# Patient Record
Sex: FEMALE | Race: WHITE | NOT HISPANIC OR LATINO
[De-identification: names, ages, dates, MRNs, and addresses within clinical notes are randomized per-mention and may not be internally consistent; named-entity substitution may affect disease eponyms.]

---

## 2020-02-06 ENCOUNTER — LABORATORY RESULT (OUTPATIENT)
Age: 60
End: 2020-02-06

## 2020-02-06 ENCOUNTER — NON-APPOINTMENT (OUTPATIENT)
Age: 60
End: 2020-02-06

## 2020-02-06 ENCOUNTER — APPOINTMENT (OUTPATIENT)
Dept: INTERNAL MEDICINE | Facility: CLINIC | Age: 60
End: 2020-02-06
Payer: MEDICAID

## 2020-02-06 VITALS
RESPIRATION RATE: 16 BRPM | HEIGHT: 70 IN | DIASTOLIC BLOOD PRESSURE: 84 MMHG | TEMPERATURE: 98.2 F | OXYGEN SATURATION: 96 % | BODY MASS INDEX: 33.7 KG/M2 | SYSTOLIC BLOOD PRESSURE: 112 MMHG | HEART RATE: 87 BPM | WEIGHT: 235.38 LBS

## 2020-02-06 DIAGNOSIS — Z82.49 FAMILY HISTORY OF ISCHEMIC HEART DISEASE AND OTHER DISEASES OF THE CIRCULATORY SYSTEM: ICD-10-CM

## 2020-02-06 DIAGNOSIS — Z78.9 OTHER SPECIFIED HEALTH STATUS: ICD-10-CM

## 2020-02-06 DIAGNOSIS — Z83.438 FAMILY HISTORY OF OTHER DISORDER OF LIPOPROTEIN METABOLISM AND OTHER LIPIDEMIA: ICD-10-CM

## 2020-02-06 DIAGNOSIS — Z83.52 FAMILY HISTORY OF EAR DISORDERS: ICD-10-CM

## 2020-02-06 PROCEDURE — 93000 ELECTROCARDIOGRAM COMPLETE: CPT | Mod: 59

## 2020-02-06 PROCEDURE — G0442 ANNUAL ALCOHOL SCREEN 15 MIN: CPT

## 2020-02-06 PROCEDURE — 99386 PREV VISIT NEW AGE 40-64: CPT | Mod: 25

## 2020-02-06 PROCEDURE — 36415 COLL VENOUS BLD VENIPUNCTURE: CPT

## 2020-02-06 RX ORDER — UBIDECARENONE/VIT E ACET 100MG-5
25 MCG CAPSULE ORAL DAILY
Qty: 180 | Refills: 0 | Status: ACTIVE | COMMUNITY
Start: 2020-02-06

## 2020-02-06 NOTE — PLAN
[FreeTextEntry1] : Endocrinology\par hyperlipidemia - continue Atorvastatin Calcium 20mg p.o.q.d. - continue low cholesterol/low fat diet - check FLP and LFTs\par hyperglycemia - advised low carbohydrate/low sugar diet - check hemoglobin A1C\par obesity - advised low carbohydrate diet and recommended continue CV exercise\par vitamin D deficiency - continue vitamin D 1000 unit capsules BID p.o.q.d. with meals - check vitamin D \par Gynecology\par osteopenia - results of upcoming repeat bone density testing to be requested  \par Will request results of last mammogram/breast US from [facility]\par Gastroenterology\par screening colonoscopy - referred to gastroenterologist, Dr. Perez as patient is due; results of Cologuard test to be requested \par Infectious Disease\par flu vaccine - discussed, refused\par Tdap (Adacel) Vaccine - evidence of vaccine administration to be requested from patient's former PCP \par Shingrix - discussed, patient to determine if vaccine is covered under medical benefits of current insurance plan and follow up for 1st dose; otherwise, instructed to follow up at the pharmacy for vaccine administration\par \par check EKG (results as above), female panel, and UA

## 2020-02-06 NOTE — PHYSICAL EXAM
[No Acute Distress] : no acute distress [Well Nourished] : well nourished [Well Developed] : well developed [Well-Appearing] : well-appearing [Normal Sclera/Conjunctiva] : normal sclera/conjunctiva [PERRL] : pupils equal round and reactive to light [EOMI] : extraocular movements intact [Normal Outer Ear/Nose] : the outer ears and nose were normal in appearance [Normal Oropharynx] : the oropharynx was normal [No JVD] : no jugular venous distention [No Lymphadenopathy] : no lymphadenopathy [Supple] : supple [Thyroid Normal, No Nodules] : the thyroid was normal and there were no nodules present [No Respiratory Distress] : no respiratory distress  [No Accessory Muscle Use] : no accessory muscle use [Clear to Auscultation] : lungs were clear to auscultation bilaterally [Normal Rate] : normal rate  [Regular Rhythm] : with a regular rhythm [Normal S1, S2] : normal S1 and S2 [No Murmur] : no murmur heard [No Carotid Bruits] : no carotid bruits [No Abdominal Bruit] : a ~M bruit was not heard ~T in the abdomen [No Varicosities] : no varicosities [Pedal Pulses Present] : the pedal pulses are present [No Edema] : there was no peripheral edema [No Extremity Clubbing/Cyanosis] : no extremity clubbing/cyanosis [No Palpable Aorta] : no palpable aorta [Soft] : abdomen soft [Non Tender] : non-tender [Non-distended] : non-distended [No Masses] : no abdominal mass palpated [No HSM] : no HSM [Normal Bowel Sounds] : normal bowel sounds [Normal Posterior Cervical Nodes] : no posterior cervical lymphadenopathy [Normal Anterior Cervical Nodes] : no anterior cervical lymphadenopathy [No CVA Tenderness] : no CVA  tenderness [No Spinal Tenderness] : no spinal tenderness [No Joint Swelling] : no joint swelling [Grossly Normal Strength/Tone] : grossly normal strength/tone [No Rash] : no rash [Coordination Grossly Intact] : coordination grossly intact [No Focal Deficits] : no focal deficits [Normal Gait] : normal gait [Deep Tendon Reflexes (DTR)] : deep tendon reflexes were 2+ and symmetric [Normal Affect] : the affect was normal [Normal Insight/Judgement] : insight and judgment were intact [de-identified] : obese

## 2020-02-06 NOTE — COUNSELING
[Benefits of weight loss discussed] : Benefits of weight loss discussed [Structured Weight Management Program suggested:] : Structured weight management program suggested [Good understanding] : Patient has a good understanding of disease, goals and obesity follow-up plan [Encouraged to increase physical activity] : Encouraged to increase physical activity [FreeTextEntry1] : Discussed low-carbohydrate diet plans.

## 2020-02-06 NOTE — ASSESSMENT
[FreeTextEntry1] : New patient is a 59 year old female with a past medical history as above who presents for annual physical exam.

## 2020-02-06 NOTE — HISTORY OF PRESENT ILLNESS
[FreeTextEntry1] : new patient annual physical exam\par  [de-identified] : New patient is a 59 year old female with a past medical history as below who presents for annual physical exam. Patient states she is taking all medications as prescribed and denies any adverse reactions or side effects. She denies any new arthralgias or myalgias on Atorvastatin Calcium. Last blood work-up revealed elevated hemoglobin A1C (5.8). Cologuard testing was done 6 months ago, but patient has never had a screening colonoscopy. Patient states she saw her OB/GYN last month. Last mammogram/breast US was in November 2019 at [facility]. She notes upcoming repeat bone density testing per her OB/GYN. Patient notes seeing a retina specialist in the past regarding floaters. Patient states she has been trying to maintain a more well-balanced diet and exercise more regularly. Patient is not interested in receiving the flu vaccine today. She states she received the Tetanus vaccine about 3 years ago. She inquires about the Shingles vaccine (Shingrix).

## 2020-02-06 NOTE — ADDENDUM
[FreeTextEntry1] : I, Raffaele Crump, acted solely as scribe for Dr. David Sauer DO on this date 02/06/2020  9:00AM .\par \par All medical record entries made by the Scribe were at my, Dr. David Sauer DO direction and personally dictated by me on 02/06/2020  9:00AM. I have reviewed the chart and agree that the record accurately reflects my personal performance of the history, physical exam, assessment and plan. I have also personally directed, reviewed and agreed with the chart.\par

## 2020-02-06 NOTE — HEALTH RISK ASSESSMENT
[Yes] : Yes [No] : In the past 12 months have you used drugs other than those required for medical reasons? No [1] : 2) Feeling down, depressed, or hopeless for several days (1) [Patient reported mammogram was normal] : Patient reported mammogram was normal [Monthly or less (1 pt)] : Monthly or less (1 point) [1 or 2 (0 pts)] : 1 or 2 (0 points) [Never (0 pts)] : Never (0 points) [] : No [Audit-CScore] : 1 [[id number]] : 2 [MammogramDate] : 11/19 [MammogramComments] : Will request results of mammogram/breast US from [facility].  [BoneDensityComments] : Scheduled; results to be requested. [ColonoscopyComments] : Referred to Dr. Perez.

## 2020-02-09 LAB
25(OH)D3 SERPL-MCNC: 33.3 NG/ML
ALBUMIN SERPL ELPH-MCNC: 4.9 G/DL
ALP BLD-CCNC: 89 U/L
ALT SERPL-CCNC: 21 U/L
ANION GAP SERPL CALC-SCNC: 14 MMOL/L
APPEARANCE: ABNORMAL
AST SERPL-CCNC: 18 U/L
BASOPHILS # BLD AUTO: 0.04 K/UL
BASOPHILS NFR BLD AUTO: 0.5 %
BILIRUB SERPL-MCNC: 0.9 MG/DL
BILIRUBIN URINE: NEGATIVE
BLOOD URINE: NEGATIVE
BUN SERPL-MCNC: 23 MG/DL
CALCIUM SERPL-MCNC: 10.1 MG/DL
CHLORIDE SERPL-SCNC: 101 MMOL/L
CHOLEST SERPL-MCNC: 194 MG/DL
CHOLEST/HDLC SERPL: 4.7 RATIO
CO2 SERPL-SCNC: 28 MMOL/L
COLOR: ABNORMAL
CREAT SERPL-MCNC: 1.05 MG/DL
EOSINOPHIL # BLD AUTO: 0.16 K/UL
EOSINOPHIL NFR BLD AUTO: 2.2 %
ESTIMATED AVERAGE GLUCOSE: 120 MG/DL
GLUCOSE QUALITATIVE U: NEGATIVE
GLUCOSE SERPL-MCNC: 100 MG/DL
HBA1C MFR BLD HPLC: 5.8 %
HCT VFR BLD CALC: 46.5 %
HDLC SERPL-MCNC: 41 MG/DL
HGB BLD-MCNC: 14.7 G/DL
IMM GRANULOCYTES NFR BLD AUTO: 0.4 %
KETONES URINE: NEGATIVE
LDLC SERPL CALC-MCNC: 120 MG/DL
LEUKOCYTE ESTERASE URINE: ABNORMAL
LYMPHOCYTES # BLD AUTO: 2.43 K/UL
LYMPHOCYTES NFR BLD AUTO: 33 %
MAN DIFF?: NORMAL
MCHC RBC-ENTMCNC: 30.8 PG
MCHC RBC-ENTMCNC: 31.6 GM/DL
MCV RBC AUTO: 97.5 FL
MONOCYTES # BLD AUTO: 0.57 K/UL
MONOCYTES NFR BLD AUTO: 7.7 %
NEUTROPHILS # BLD AUTO: 4.14 K/UL
NEUTROPHILS NFR BLD AUTO: 56.2 %
NITRITE URINE: NEGATIVE
PH URINE: 5.5
PLATELET # BLD AUTO: 291 K/UL
POTASSIUM SERPL-SCNC: 4.4 MMOL/L
PROT SERPL-MCNC: 7.2 G/DL
PROTEIN URINE: NORMAL
RBC # BLD: 4.77 M/UL
RBC # FLD: 13.2 %
SODIUM SERPL-SCNC: 143 MMOL/L
SPECIFIC GRAVITY URINE: 1.02
TRIGL SERPL-MCNC: 164 MG/DL
TSH SERPL-ACNC: 4.06 UIU/ML
UROBILINOGEN URINE: NORMAL
WBC # FLD AUTO: 7.37 K/UL

## 2020-07-22 ENCOUNTER — RX RENEWAL (OUTPATIENT)
Age: 60
End: 2020-07-22

## 2020-09-03 ENCOUNTER — LABORATORY RESULT (OUTPATIENT)
Age: 60
End: 2020-09-03

## 2020-09-03 ENCOUNTER — APPOINTMENT (OUTPATIENT)
Dept: INTERNAL MEDICINE | Facility: CLINIC | Age: 60
End: 2020-09-03
Payer: MEDICAID

## 2020-09-03 VITALS
WEIGHT: 236 LBS | HEIGHT: 70 IN | DIASTOLIC BLOOD PRESSURE: 80 MMHG | OXYGEN SATURATION: 98 % | TEMPERATURE: 98.4 F | RESPIRATION RATE: 16 BRPM | SYSTOLIC BLOOD PRESSURE: 124 MMHG | BODY MASS INDEX: 33.79 KG/M2 | HEART RATE: 84 BPM

## 2020-09-03 DIAGNOSIS — Z12.39 ENCOUNTER FOR OTHER SCREENING FOR MALIGNANT NEOPLASM OF BREAST: ICD-10-CM

## 2020-09-03 DIAGNOSIS — Z13.820 ENCOUNTER FOR SCREENING FOR OSTEOPOROSIS: ICD-10-CM

## 2020-09-03 DIAGNOSIS — M85.80 OTHER SPECIFIED DISORDERS OF BONE DENSITY AND STRUCTURE, UNSPECIFIED SITE: ICD-10-CM

## 2020-09-03 PROCEDURE — 99214 OFFICE O/P EST MOD 30 MIN: CPT | Mod: 25

## 2020-09-03 PROCEDURE — 36415 COLL VENOUS BLD VENIPUNCTURE: CPT

## 2020-09-03 NOTE — ASSESSMENT
[FreeTextEntry1] : Patient is a 59 year old female with a past medical history as above who presents for fasting blood work and general follow-up.\par

## 2020-09-03 NOTE — PHYSICAL EXAM
[No Acute Distress] : no acute distress [Well Nourished] : well nourished [Well Developed] : well developed [Well-Appearing] : well-appearing [PERRL] : pupils equal round and reactive to light [Normal Sclera/Conjunctiva] : normal sclera/conjunctiva [EOMI] : extraocular movements intact [Normal Outer Ear/Nose] : the outer ears and nose were normal in appearance [No JVD] : no jugular venous distention [Normal Oropharynx] : the oropharynx was normal [Supple] : supple [No Lymphadenopathy] : no lymphadenopathy [No Respiratory Distress] : no respiratory distress  [Thyroid Normal, No Nodules] : the thyroid was normal and there were no nodules present [No Accessory Muscle Use] : no accessory muscle use [Clear to Auscultation] : lungs were clear to auscultation bilaterally [Normal Rate] : normal rate  [Normal S1, S2] : normal S1 and S2 [Regular Rhythm] : with a regular rhythm [No Murmur] : no murmur heard [No Abdominal Bruit] : a ~M bruit was not heard ~T in the abdomen [No Carotid Bruits] : no carotid bruits [No Varicosities] : no varicosities [No Edema] : there was no peripheral edema [Pedal Pulses Present] : the pedal pulses are present [No Palpable Aorta] : no palpable aorta [No Extremity Clubbing/Cyanosis] : no extremity clubbing/cyanosis [Soft] : abdomen soft [Non Tender] : non-tender [Non-distended] : non-distended [No Masses] : no abdominal mass palpated [Normal Posterior Cervical Nodes] : no posterior cervical lymphadenopathy [Normal Bowel Sounds] : normal bowel sounds [No HSM] : no HSM [Normal Anterior Cervical Nodes] : no anterior cervical lymphadenopathy [No Spinal Tenderness] : no spinal tenderness [No CVA Tenderness] : no CVA  tenderness [No Joint Swelling] : no joint swelling [No Rash] : no rash [Grossly Normal Strength/Tone] : grossly normal strength/tone [No Focal Deficits] : no focal deficits [Coordination Grossly Intact] : coordination grossly intact [Normal Affect] : the affect was normal [Normal Gait] : normal gait [Normal Insight/Judgement] : insight and judgment were intact [de-identified] : obese

## 2020-09-03 NOTE — PLAN
[FreeTextEntry1] : Cardiology\par hyperlipidemia - continue Atorvastatin Calcium 20mg p.o.q.d; discussed increasing dosage pending blood work results - advised low cholesterol/low fat diet - check FLP and LFTs\par hypertriglyceridemia - advised low cholesterol/low fat and low carbohydrate/low sugar diet - check FLP and LFTs\par Endocrinology\par hyperglycemia - advised low carbohydrate/low sugar diet - check hemoglobin A1C\par obesity - advised low carbohydrate diet and CV exercise\par vitamin D deficiency - continue vitamin D 1000 IU BID p.o.q.d. with meals - check vitamin D \par Gynecology\par Rx given for mammogram/breast US\par Musculoskeletal\par Rx given for DEXA scan\par Integumentary\par hair loss - start OTC Biotin p.o.q.d. - check thyroid panel; advised to follow up with dermatologist, Dr. Pickard for further evaluation if blood work is negative \par Immunization\par flu vaccine - discussed, recommended following up in October for vaccine administration \par Shingrix - discussed, patient to determine if vaccine is covered under medical benefits of current insurance plan and follow up for 1st dose; otherwise, instructed to follow up at the pharmacy for vaccine administration\par \par check female panel and thyroid panel

## 2020-09-03 NOTE — ADDENDUM
[FreeTextEntry1] : I, Raffaeel Crump, acted solely as scribe for Dr. David Sauer DO on this date 09/03/2020  8:40AM .\par \par All medical record entries made by the Scribe were at my, Dr. David Sauer DO direction and personally dictated by me on 09/03/2020  8:40AM. I have reviewed the chart and agree that the record accurately reflects my personal performance of the history, physical exam, assessment and plan. I have also personally directed, reviewed and agreed with the chart.\par

## 2020-09-03 NOTE — HISTORY OF PRESENT ILLNESS
[FreeTextEntry1] : fasting blood work and general follow-up [de-identified] : Patient is a 59 year old female with a past medical history as below who presents for fasting blood work and general follow-up. Patient is taking Atorvastatin Calcium as prescribed and denies any diffuse arthralgias or myalgias on the medication. Patient's last mammogram and breast US were in November 2019. She is due for bone density testing. Patient notes hair loss that started about 5 months ago. She no longer has her menstrual cycle. She sees dermatologist, Dr. Pickard. Patient is due to receive the Shingles Vaccine. She is considering the flu vaccine this year.

## 2020-09-03 NOTE — HEALTH RISK ASSESSMENT
[Yes] : Yes [Monthly or less (1 pt)] : Monthly or less (1 point) [1 or 2 (0 pts)] : 1 or 2 (0 points) [Never (0 pts)] : Never (0 points) [No] : In the past 12 months have you used drugs other than those required for medical reasons? No [1] : 2) Feeling down, depressed, or hopeless for several days (1) [Patient reported mammogram was normal] : Patient reported mammogram was normal [HIV Test offered] : HIV Test offered [Hepatitis C test offered] : Hepatitis C test offered [] : No [Audit-CScore] : 1 [[id number]] : 2 [MammogramDate] : 11/19 [MammogramComments] : Will request results of mammogram/breast US from [facility]; Rx given for repeat mammogram/breast US. [BoneDensityComments] : Rx given for DEXA scan.  [ColonoscopyComments] : Previously referred to Dr. Perez.

## 2020-09-15 LAB
25(OH)D3 SERPL-MCNC: 31.4 NG/ML
ALBUMIN SERPL ELPH-MCNC: 4.7 G/DL
ALP BLD-CCNC: 83 U/L
ALT SERPL-CCNC: 41 U/L
ANION GAP SERPL CALC-SCNC: 13 MMOL/L
AST SERPL-CCNC: 35 U/L
BASOPHILS # BLD AUTO: 0.04 K/UL
BASOPHILS NFR BLD AUTO: 0.6 %
BILIRUB SERPL-MCNC: 0.9 MG/DL
BUN SERPL-MCNC: 16 MG/DL
CALCIUM SERPL-MCNC: 9.5 MG/DL
CHLORIDE SERPL-SCNC: 106 MMOL/L
CHOLEST SERPL-MCNC: 173 MG/DL
CHOLEST/HDLC SERPL: 3.9 RATIO
CO2 SERPL-SCNC: 22 MMOL/L
CREAT SERPL-MCNC: 0.9 MG/DL
EOSINOPHIL # BLD AUTO: 0.15 K/UL
EOSINOPHIL NFR BLD AUTO: 2.4 %
ESTIMATED AVERAGE GLUCOSE: 123 MG/DL
GLUCOSE SERPL-MCNC: 110 MG/DL
HBA1C MFR BLD HPLC: 5.9 %
HCT VFR BLD CALC: 45.5 %
HDLC SERPL-MCNC: 44 MG/DL
HGB BLD-MCNC: 14.1 G/DL
IMM GRANULOCYTES NFR BLD AUTO: 0.5 %
LDLC SERPL CALC-MCNC: 108 MG/DL
LYMPHOCYTES # BLD AUTO: 2.17 K/UL
LYMPHOCYTES NFR BLD AUTO: 34.1 %
MAN DIFF?: NORMAL
MCHC RBC-ENTMCNC: 30.7 PG
MCHC RBC-ENTMCNC: 31 GM/DL
MCV RBC AUTO: 98.9 FL
MONOCYTES # BLD AUTO: 0.36 K/UL
MONOCYTES NFR BLD AUTO: 5.7 %
NEUTROPHILS # BLD AUTO: 3.61 K/UL
NEUTROPHILS NFR BLD AUTO: 56.7 %
PLATELET # BLD AUTO: 256 K/UL
POTASSIUM SERPL-SCNC: 4.1 MMOL/L
PROT SERPL-MCNC: 6.9 G/DL
RBC # BLD: 4.6 M/UL
RBC # FLD: 13.1 %
SODIUM SERPL-SCNC: 142 MMOL/L
T3 SERPL-MCNC: 168 NG/DL
T3FREE SERPL-MCNC: 4.21 PG/ML
T3RU NFR SERPL: 1.1 TBI
T4 FREE SERPL-MCNC: 1.4 NG/DL
T4 SERPL-MCNC: 9.1 UG/DL
THYROGLOB AB SERPL-ACNC: <20 IU/ML
THYROPEROXIDASE AB SERPL IA-ACNC: 14.7 IU/ML
TRIGL SERPL-MCNC: 106 MG/DL
TSH SERPL-ACNC: 4.79 UIU/ML
TSH SERPL-ACNC: 4.99 UIU/ML
WBC # FLD AUTO: 6.36 K/UL

## 2020-10-26 DIAGNOSIS — Z23 ENCOUNTER FOR IMMUNIZATION: ICD-10-CM

## 2020-12-04 ENCOUNTER — NON-APPOINTMENT (OUTPATIENT)
Age: 60
End: 2020-12-04

## 2020-12-13 ENCOUNTER — NON-APPOINTMENT (OUTPATIENT)
Age: 60
End: 2020-12-13

## 2020-12-18 ENCOUNTER — NON-APPOINTMENT (OUTPATIENT)
Age: 60
End: 2020-12-18

## 2021-02-02 ENCOUNTER — NON-APPOINTMENT (OUTPATIENT)
Age: 61
End: 2021-02-02

## 2021-02-09 ENCOUNTER — LABORATORY RESULT (OUTPATIENT)
Age: 61
End: 2021-02-09

## 2021-02-09 ENCOUNTER — APPOINTMENT (OUTPATIENT)
Dept: INTERNAL MEDICINE | Facility: CLINIC | Age: 61
End: 2021-02-09
Payer: MEDICAID

## 2021-02-09 ENCOUNTER — NON-APPOINTMENT (OUTPATIENT)
Age: 61
End: 2021-02-09

## 2021-02-09 VITALS
HEIGHT: 70 IN | RESPIRATION RATE: 15 BRPM | SYSTOLIC BLOOD PRESSURE: 140 MMHG | BODY MASS INDEX: 32.78 KG/M2 | HEART RATE: 86 BPM | OXYGEN SATURATION: 97 % | DIASTOLIC BLOOD PRESSURE: 78 MMHG | WEIGHT: 229 LBS | TEMPERATURE: 97.7 F

## 2021-02-09 VITALS — SYSTOLIC BLOOD PRESSURE: 142 MMHG | DIASTOLIC BLOOD PRESSURE: 84 MMHG

## 2021-02-09 DIAGNOSIS — R03.0 ELEVATED BLOOD-PRESSURE READING, W/OUT DIAGNOSIS OF HYPERTENSION: ICD-10-CM

## 2021-02-09 PROCEDURE — 99072 ADDL SUPL MATRL&STAF TM PHE: CPT

## 2021-02-09 PROCEDURE — 36415 COLL VENOUS BLD VENIPUNCTURE: CPT

## 2021-02-09 PROCEDURE — G0442 ANNUAL ALCOHOL SCREEN 15 MIN: CPT | Mod: NC

## 2021-02-09 PROCEDURE — 99396 PREV VISIT EST AGE 40-64: CPT | Mod: 25

## 2021-02-09 PROCEDURE — 93000 ELECTROCARDIOGRAM COMPLETE: CPT | Mod: 59

## 2021-02-09 NOTE — HISTORY OF PRESENT ILLNESS
[FreeTextEntry1] : annual wellness visit [de-identified] : Patient is a 60 year old female with a past medical history as below who presents for an annual wellness visit. Patient is taking Atorvastatin Calcium as prescribed and denies diffuse arthralgias or myalgias on the medication. Patient last saw her GYN, Dr. Robert 2 weeks ago. She is up-to-date with annual breast imaging (results noted below). Bone density testing in December 2020 revealed osteoporosis. Patient is due for a screening colonoscopy. She notes having Cologuard testing several years ago. Patient notes a chronic history of intermittent numbness in both hands. She states the numbness usually resolves on its own after some time. She denies neck pain or pain radiating down her arms. She states she sleeps on her right shoulder. She notes seeing a neurologist in the past. Patient notes possible left axillary swelling. Patient continues to note hair loss. Patient receive the flu vaccine on 10/24/20. She received the 1st dose of the Shingles (Shingrix) Vaccine on 1/11/21. She has not received the COVID-19 Vaccine.

## 2021-02-09 NOTE — ASSESSMENT
[FreeTextEntry1] : Patient is a 60 year old female with a past medical history as above who presents for an annual wellness visit.

## 2021-02-09 NOTE — REVIEW OF SYSTEMS
[Negative] : Psychiatric [Hair Changes] : hair changes [de-identified] : numbness in both hands  [de-identified] : hair loss  [de-identified] : possible left axillary swelling

## 2021-02-09 NOTE — ADDENDUM
[FreeTextEntry1] : I, Raffaele Crmup, acted solely as scribe for Dr. David Sauer DO on this date 02/09/2021  9:00AM .\par \par All medical record entries made by the Scribe were at my, Dr. David Sauer DO direction and personally dictated by me on 02/09/2021  9:00AM. I have reviewed the chart and agree that the record accurately reflects my personal performance of the history, physical exam, assessment and plan. I have also personally directed, reviewed and agreed with the chart.\par

## 2021-02-09 NOTE — HEALTH RISK ASSESSMENT
[Yes] : Yes [1 or 2 (0 pts)] : 1 or 2 (0 points) [Never (0 pts)] : Never (0 points) [No] : In the past 12 months have you used drugs other than those required for medical reasons? No [2 - 4 times a month (2 pts)] : 2-4 times a month (2 points) [0] : 2) Feeling down, depressed, or hopeless: Not at all (0) [Patient reported PAP Smear was normal] : Patient reported PAP Smear was normal [] : No [Audit-CScore] : 2 [[id number]] : 0 [MammogramDate] : 12/20 [MammogramComments] : No mammographic or sonographic evidence of malignancy; BI-RADS Category 2: Benign. [BoneDensityDate] : 12/20 [PapSmearComments] : Results to be requested from GYN, Dr. Robert's office.  [BoneDensityComments] : Osteoporosis.  [ColonoscopyComments] : Referred to Dr. Perez; discussed FIT-DNA testing.

## 2021-02-09 NOTE — COUNSELING
[Potential consequences of obesity discussed] : Potential consequences of obesity discussed [Benefits of weight loss discussed] : Benefits of weight loss discussed [Structured Weight Management Program suggested:] : Structured weight management program suggested [Good understanding] : Patient has a good understanding of disease, goals and obesity follow-up plan [FreeTextEntry1] : Discussed low-carbohydrate diet plans.

## 2021-02-09 NOTE — PLAN
[FreeTextEntry1] : Cardiology\par elevated BP - check EKG (results as above) - advised low sodium diet and continued weight loss; advised to RTO in 3-4 months for BP check\par hyperlipidemia - continue Atorvastatin Calcium 20mg p.o.q.d. - advised low cholesterol/low fat diet - check FLP and LFTs\par hypertriglyceridemia - advised low cholesterol/low fat and low carbohydrate/low sugar diet - check FLP \par Endocrinology\par hyperglycemia - advised low carbohydrate/low sugar diet - check hemoglobin A1C\par obesity - advised low carbohydrate diet and CV exercise\par elevated TSH - recheck TSH - referred to endocrinologist, Dr. Hughes for further evaluation \par vitamin D deficiency - continue vitamin D 1000 IU BID p.o.q.d. with meals - check vitamin D \par Gynecology\par Results of last pap smear to be requested from GYN, Dr. Robert's office\par Gastroenterology\par screening colonoscopy - referred to gastroenterologist, Dr. Perez for consultation; discussed FIT-DNA testing \par Endocrinology/Musculoskeletal\par osteoporosis - recommended Os-Jw or Citracal p.o.q.d. - referred to endocrinologist, Dr. David Hughes to further discuss treatment options\par Integumentary\par hair loss - possible secondary to thyroid dysfunction - patient to follow up with endocrinologist, Dr. Hughes; if work-up is negative, will refer patient to dermatology for further evaluation/treatment  \par Neurology\par numbness in hands - likely secondary to nerve impingement from sleeping in an awkward position - advised to follow up if numbness persists and does not resolve or any new symptoms develop \par Immunization\par Shingrix (2nd dose) - vaccine to be administered at pharmacy; evidence of vaccine administration to be requested\par \par check EKG (results as above), female panel, hemoglobin A1C, and UA\par RTO in 3-4 months for BP check.

## 2021-02-09 NOTE — PHYSICAL EXAM
[No Acute Distress] : no acute distress [Well Nourished] : well nourished [Well Developed] : well developed [Well-Appearing] : well-appearing [PERRL] : pupils equal round and reactive to light [Normal Sclera/Conjunctiva] : normal sclera/conjunctiva [EOMI] : extraocular movements intact [Normal Outer Ear/Nose] : the outer ears and nose were normal in appearance [Normal Oropharynx] : the oropharynx was normal [No JVD] : no jugular venous distention [No Lymphadenopathy] : no lymphadenopathy [Supple] : supple [Thyroid Normal, No Nodules] : the thyroid was normal and there were no nodules present [No Respiratory Distress] : no respiratory distress  [No Accessory Muscle Use] : no accessory muscle use [Clear to Auscultation] : lungs were clear to auscultation bilaterally [Normal Rate] : normal rate  [Regular Rhythm] : with a regular rhythm [Normal S1, S2] : normal S1 and S2 [No Murmur] : no murmur heard [No Carotid Bruits] : no carotid bruits [No Abdominal Bruit] : a ~M bruit was not heard ~T in the abdomen [No Varicosities] : no varicosities [Pedal Pulses Present] : the pedal pulses are present [No Edema] : there was no peripheral edema [No Palpable Aorta] : no palpable aorta [No Extremity Clubbing/Cyanosis] : no extremity clubbing/cyanosis [Soft] : abdomen soft [Non Tender] : non-tender [Non-distended] : non-distended [No Masses] : no abdominal mass palpated [No HSM] : no HSM [Normal Bowel Sounds] : normal bowel sounds [Normal Posterior Cervical Nodes] : no posterior cervical lymphadenopathy [Normal Anterior Cervical Nodes] : no anterior cervical lymphadenopathy [No CVA Tenderness] : no CVA  tenderness [No Spinal Tenderness] : no spinal tenderness [No Joint Swelling] : no joint swelling [Grossly Normal Strength/Tone] : grossly normal strength/tone [No Rash] : no rash [Coordination Grossly Intact] : coordination grossly intact [No Focal Deficits] : no focal deficits [Normal Gait] : normal gait [Deep Tendon Reflexes (DTR)] : deep tendon reflexes were 2+ and symmetric [Normal Affect] : the affect was normal [Normal Insight/Judgement] : insight and judgment were intact [No Axillary Lymphadenopathy] : no axillary lymphadenopathy [de-identified] : obese

## 2021-02-15 LAB
25(OH)D3 SERPL-MCNC: 33.3 NG/ML
ALBUMIN SERPL ELPH-MCNC: 4.7 G/DL
ALP BLD-CCNC: 81 U/L
ALT SERPL-CCNC: 22 U/L
ANION GAP SERPL CALC-SCNC: 15 MMOL/L
APPEARANCE: CLEAR
AST SERPL-CCNC: 18 U/L
BASOPHILS # BLD AUTO: 0.05 K/UL
BASOPHILS NFR BLD AUTO: 0.7 %
BILIRUB SERPL-MCNC: 0.6 MG/DL
BILIRUBIN URINE: NEGATIVE
BLOOD URINE: NEGATIVE
BUN SERPL-MCNC: 18 MG/DL
CALCIUM SERPL-MCNC: 9.9 MG/DL
CHLORIDE SERPL-SCNC: 102 MMOL/L
CHOLEST SERPL-MCNC: 178 MG/DL
CO2 SERPL-SCNC: 26 MMOL/L
COLOR: YELLOW
CREAT SERPL-MCNC: 1.1 MG/DL
EOSINOPHIL # BLD AUTO: 0.16 K/UL
EOSINOPHIL NFR BLD AUTO: 2.1 %
ESTIMATED AVERAGE GLUCOSE: 123 MG/DL
GLUCOSE QUALITATIVE U: NEGATIVE
GLUCOSE SERPL-MCNC: 99 MG/DL
HBA1C MFR BLD HPLC: 5.9 %
HCT VFR BLD CALC: 45.6 %
HDLC SERPL-MCNC: 41 MG/DL
HGB BLD-MCNC: 14.4 G/DL
IMM GRANULOCYTES NFR BLD AUTO: 0.3 %
KETONES URINE: NEGATIVE
LDLC SERPL CALC-MCNC: 105 MG/DL
LEUKOCYTE ESTERASE URINE: ABNORMAL
LYMPHOCYTES # BLD AUTO: 2.3 K/UL
LYMPHOCYTES NFR BLD AUTO: 30.2 %
MAN DIFF?: NORMAL
MCHC RBC-ENTMCNC: 31 PG
MCHC RBC-ENTMCNC: 31.6 GM/DL
MCV RBC AUTO: 98.3 FL
MONOCYTES # BLD AUTO: 0.49 K/UL
MONOCYTES NFR BLD AUTO: 6.4 %
NEUTROPHILS # BLD AUTO: 4.59 K/UL
NEUTROPHILS NFR BLD AUTO: 60.3 %
NITRITE URINE: NEGATIVE
NONHDLC SERPL-MCNC: 137 MG/DL
PH URINE: 5.5
PLATELET # BLD AUTO: 286 K/UL
POTASSIUM SERPL-SCNC: 4.4 MMOL/L
PROT SERPL-MCNC: 7.1 G/DL
PROTEIN URINE: NORMAL
RBC # BLD: 4.64 M/UL
RBC # FLD: 13.2 %
SODIUM SERPL-SCNC: 142 MMOL/L
SPECIFIC GRAVITY URINE: 1.03
TRIGL SERPL-MCNC: 159 MG/DL
TSH SERPL-ACNC: 4.12 UIU/ML
UROBILINOGEN URINE: NORMAL
WBC # FLD AUTO: 7.61 K/UL

## 2021-02-23 ENCOUNTER — TRANSCRIPTION ENCOUNTER (OUTPATIENT)
Age: 61
End: 2021-02-23

## 2021-04-26 ENCOUNTER — TRANSCRIPTION ENCOUNTER (OUTPATIENT)
Age: 61
End: 2021-04-26

## 2021-04-27 ENCOUNTER — APPOINTMENT (OUTPATIENT)
Dept: INTERNAL MEDICINE | Facility: CLINIC | Age: 61
End: 2021-04-27

## 2021-05-10 ENCOUNTER — APPOINTMENT (OUTPATIENT)
Dept: INTERNAL MEDICINE | Facility: CLINIC | Age: 61
End: 2021-05-10

## 2021-07-15 ENCOUNTER — APPOINTMENT (OUTPATIENT)
Dept: INTERNAL MEDICINE | Facility: CLINIC | Age: 61
End: 2021-07-15
Payer: MEDICAID

## 2021-07-15 VITALS
SYSTOLIC BLOOD PRESSURE: 128 MMHG | HEART RATE: 78 BPM | TEMPERATURE: 97.8 F | BODY MASS INDEX: 30.06 KG/M2 | DIASTOLIC BLOOD PRESSURE: 72 MMHG | WEIGHT: 210 LBS | RESPIRATION RATE: 16 BRPM | HEIGHT: 70 IN

## 2021-07-15 PROCEDURE — 99072 ADDL SUPL MATRL&STAF TM PHE: CPT

## 2021-07-15 PROCEDURE — 99214 OFFICE O/P EST MOD 30 MIN: CPT | Mod: 25

## 2021-07-15 PROCEDURE — 36415 COLL VENOUS BLD VENIPUNCTURE: CPT

## 2021-07-15 NOTE — HEALTH RISK ASSESSMENT
[Yes] : Yes [2 - 4 times a month (2 pts)] : 2-4 times a month (2 points) [1 or 2 (0 pts)] : 1 or 2 (0 points) [Never (0 pts)] : Never (0 points) [No] : In the past 12 months have you used drugs other than those required for medical reasons? No [0] : 2) Feeling down, depressed, or hopeless: Not at all (0) [PHQ-2 Negative - No further assessment needed] : PHQ-2 Negative - No further assessment needed [Patient reported PAP Smear was normal] : Patient reported PAP Smear was normal [] : No [Audit-CScore] : 2 [de-identified] : Treadmill.  [de-identified] : Low carb/low sugar.  [FUF1Wbtmp] : 0 [MammogramDate] : 12/20 [MammogramComments] : No mammographic or sonographic evidence of malignancy; BI-RADS Category 2: Benign. [PapSmearComments] : Results to be requested from GYN, Dr. Robert's office.  [BoneDensityDate] : 12/20 [BoneDensityComments] : Osteoporosis.  [ColonoscopyComments] : Previously referred to Dr. Perez; previously discussed FIT-DNA testing: kit was given.

## 2021-07-15 NOTE — ASSESSMENT
[FreeTextEntry1] : Patient is a 60 year old female with a past medical history as above who presents for fasting blood work, blood pressure check, and general follow-up.

## 2021-07-15 NOTE — ADDENDUM
[FreeTextEntry1] : I, Raffaele Crump, acted solely as scribe for Dr. David Sauer DO on this date 07/15/2021  9:50AM .\par \par All medical record entries made by the Scribe were at my, Dr. David Sauer DO direction and personally dictated by me on 07/15/2021  9:50AM. I have reviewed the chart and agree that the record accurately reflects my personal performance of the history, physical exam, assessment and plan. I have also personally directed, reviewed and agreed with the chart.\par

## 2021-07-15 NOTE — HISTORY OF PRESENT ILLNESS
[FreeTextEntry1] : fasting blood work, blood pressure check, and general follow-up [de-identified] : Patient is a 60 year old female with a past medical history as below who presents for fasting blood work, blood pressure check, and general follow-up. Patient is taking Atorvastatin Calcium as prescribed. Patient notes persistent LLE muscle pain (typically at night) that started about 1 month ago. She has been taking 2 doses of OTC Motrin nightly. She notes taking OTC Advil/Tylenol in the past without relief. She denies LLE muscle pain when walking. Patient notes right knee pain when exercising. She has noted improvement since she started using a compression sleeve. She notes seeing an orthopedist at [facility] in the past. Patient noted a rash last week (chest, RUE; resolved without medication). Patient attributes weight loss since her last visit to maintaining a more well-balanced, low-carbohydrate/low-sugar diet and exercising regularly (treadmill). Patient has received both doses of the COVID-19 Vaccine. She inquires about evaluating her response to the vaccines.

## 2021-07-15 NOTE — PLAN
[FreeTextEntry1] : Cardiology\par elevated BP readings w/o Dx of HTN - continue low sodium diet and continued weight loss; will continue to monitor BP\par hyperlipidemia - continue Atorvastatin Calcium 20mg p.o.q.d. as directed - continue low cholesterol/low fat diet - check FLP and LFTs\par hypertriglyceridemia - continue low cholesterol/low fat and low carbohydrate/low sugar diet - check FLP and LFTs\par Endocrinology\par hyperglycemia - continue low carbohydrate/low sugar diet - check hemoglobin A1C\par obesity - continue low carbohydrate diet and CV exercise\par elevated TSH - previously referred to endocrinologist, Dr. Hughes for further evaluation \par history of Vitamin D deficiency - continue Vitamin D 1000 IU BID p.o.q.d. with meals as directed  \par Gynecology\par Results of last pap smear to be requested from GYN, Dr. Robert's office\par Gastroenterology\par screening colonoscopy - previously referred to gastroenterologist, Dr. Perez for consultation; previously discussed FIT-DNA testing: kit was given \par Musculoskeletal\par right knee pain - continue using compression sleeve - if pain persists/worsens, recommended following up with orthopedist at [facility] for further evaluation\par idiopathic leg cramping (LLE) - recommended Tonic Water w/ Quinine   \par Endocrinology/Musculoskeletal\par osteoporosis - previously recommended Os-Jw or Citracal p.o.q.d. - previously referred to endocrinologist, Dr. David Hughes to further discuss treatment options\par Immunization/Infectious Disease\par Shingrix (2nd dose) - vaccine to be administered at pharmacy; evidence of vaccine administration to be requested\par Check COVID-19 Sai Domain Antibody \par \par check CMP, FLP, hemoglobin A1C, and COVID-19 Sai Domain Antibody

## 2021-07-15 NOTE — REVIEW OF SYSTEMS
[Negative] : Heme/Lymph [Recent Change In Weight] : ~T recent weight change [Joint Pain] : joint pain [Muscle Pain] : muscle pain [FreeTextEntry2] : weight loss [FreeTextEntry9] : right knee pain; LLE muscle pain/cramping

## 2021-07-16 ENCOUNTER — NON-APPOINTMENT (OUTPATIENT)
Age: 61
End: 2021-07-16

## 2021-07-16 LAB
ALBUMIN SERPL ELPH-MCNC: 4.7 G/DL
ALP BLD-CCNC: 72 U/L
ALT SERPL-CCNC: 25 U/L
ANION GAP SERPL CALC-SCNC: 12 MMOL/L
AST SERPL-CCNC: 21 U/L
BILIRUB SERPL-MCNC: 0.9 MG/DL
BUN SERPL-MCNC: 17 MG/DL
CALCIUM SERPL-MCNC: 10 MG/DL
CHLORIDE SERPL-SCNC: 104 MMOL/L
CHOLEST SERPL-MCNC: 173 MG/DL
CO2 SERPL-SCNC: 26 MMOL/L
COVID-19 SPIKE DOMAIN ANTIBODY INTERPRETATION: POSITIVE
CREAT SERPL-MCNC: 0.95 MG/DL
ESTIMATED AVERAGE GLUCOSE: 117 MG/DL
GLUCOSE SERPL-MCNC: 98 MG/DL
HBA1C MFR BLD HPLC: 5.7 %
HDLC SERPL-MCNC: 48 MG/DL
LDLC SERPL CALC-MCNC: 104 MG/DL
NONHDLC SERPL-MCNC: 125 MG/DL
POTASSIUM SERPL-SCNC: 4.2 MMOL/L
PROT SERPL-MCNC: 6.9 G/DL
SARS-COV-2 AB SERPL IA-ACNC: >250 U/ML
SODIUM SERPL-SCNC: 143 MMOL/L
TRIGL SERPL-MCNC: 103 MG/DL

## 2021-07-29 ENCOUNTER — NON-APPOINTMENT (OUTPATIENT)
Age: 61
End: 2021-07-29

## 2021-12-04 ENCOUNTER — TRANSCRIPTION ENCOUNTER (OUTPATIENT)
Age: 61
End: 2021-12-04

## 2021-12-14 ENCOUNTER — NON-APPOINTMENT (OUTPATIENT)
Age: 61
End: 2021-12-14

## 2021-12-22 ENCOUNTER — APPOINTMENT (OUTPATIENT)
Dept: INTERNAL MEDICINE | Facility: CLINIC | Age: 61
End: 2021-12-22
Payer: MEDICAID

## 2021-12-22 VITALS
OXYGEN SATURATION: 98 % | SYSTOLIC BLOOD PRESSURE: 118 MMHG | BODY MASS INDEX: 30.06 KG/M2 | WEIGHT: 210 LBS | TEMPERATURE: 98.7 F | HEART RATE: 75 BPM | RESPIRATION RATE: 16 BRPM | DIASTOLIC BLOOD PRESSURE: 80 MMHG | HEIGHT: 70 IN

## 2021-12-22 DIAGNOSIS — M79.674 PAIN IN RIGHT TOE(S): ICD-10-CM

## 2021-12-22 PROCEDURE — 36415 COLL VENOUS BLD VENIPUNCTURE: CPT

## 2021-12-22 PROCEDURE — 99214 OFFICE O/P EST MOD 30 MIN: CPT | Mod: 25

## 2021-12-22 RX ORDER — NYSTATIN 100000 [USP'U]/G
100000 CREAM TOPICAL
Qty: 30 | Refills: 0 | Status: DISCONTINUED | COMMUNITY
Start: 2021-01-28 | End: 2021-12-22

## 2021-12-22 NOTE — REVIEW OF SYSTEMS
[Recent Change In Weight] : ~T recent weight change [Negative] : Heme/Lymph [FreeTextEntry2] : weight gain [FreeTextEntry9] : discomfort around the 1st digit of the right foot

## 2021-12-22 NOTE — HEALTH RISK ASSESSMENT
[Never] : Never [Yes] : Yes [2 - 4 times a month (2 pts)] : 2-4 times a month (2 points) [1 or 2 (0 pts)] : 1 or 2 (0 points) [Never (0 pts)] : Never (0 points) [No] : In the past 12 months have you used drugs other than those required for medical reasons? No [0] : 2) Feeling down, depressed, or hopeless: Not at all (0) [PHQ-2 Negative - No further assessment needed] : PHQ-2 Negative - No further assessment needed [Patient reported PAP Smear was normal] : Patient reported PAP Smear was normal [Audit-CScore] : 2 [de-identified] : Treadmill.  [de-identified] : Low carb/low sugar.  [[id number]] : 0 [MammogramDate] : 12/21 [MammogramComments] : No mammographic or ultrasonographic evidence of malignancy; BI-RADS Category 1: Negative. [PapSmearDate] : 01/21 [BoneDensityDate] : 12/20 [BoneDensityComments] : Osteoporosis.  [ColonoscopyComments] : Previously referred to Dr. Perez; again discussed FIT-DNA testing: kit given.

## 2021-12-22 NOTE — ASSESSMENT
[FreeTextEntry1] : Patient is a 60 year old female with a past medical history as above who presents for fasting blood work, Rx refill, and general follow-up.\par

## 2021-12-22 NOTE — PHYSICAL EXAM
[No Acute Distress] : no acute distress [Well Nourished] : well nourished [Well Developed] : well developed [Well-Appearing] : well-appearing [Normal Sclera/Conjunctiva] : normal sclera/conjunctiva [PERRL] : pupils equal round and reactive to light [EOMI] : extraocular movements intact [Normal Outer Ear/Nose] : the outer ears and nose were normal in appearance [Normal Oropharynx] : the oropharynx was normal [No JVD] : no jugular venous distention [No Lymphadenopathy] : no lymphadenopathy [Supple] : supple [Thyroid Normal, No Nodules] : the thyroid was normal and there were no nodules present [No Respiratory Distress] : no respiratory distress  [No Accessory Muscle Use] : no accessory muscle use [Clear to Auscultation] : lungs were clear to auscultation bilaterally [Normal Rate] : normal rate  [Regular Rhythm] : with a regular rhythm [Normal S1, S2] : normal S1 and S2 [No Murmur] : no murmur heard [No Carotid Bruits] : no carotid bruits [No Abdominal Bruit] : a ~M bruit was not heard ~T in the abdomen [No Varicosities] : no varicosities [Pedal Pulses Present] : the pedal pulses are present [No Edema] : there was no peripheral edema [No Palpable Aorta] : no palpable aorta [No Extremity Clubbing/Cyanosis] : no extremity clubbing/cyanosis [Soft] : abdomen soft [Non Tender] : non-tender [Non-distended] : non-distended [No Masses] : no abdominal mass palpated [No HSM] : no HSM [Normal Bowel Sounds] : normal bowel sounds [Normal Posterior Cervical Nodes] : no posterior cervical lymphadenopathy [Normal Anterior Cervical Nodes] : no anterior cervical lymphadenopathy [No CVA Tenderness] : no CVA  tenderness [No Spinal Tenderness] : no spinal tenderness [No Joint Swelling] : no joint swelling [Grossly Normal Strength/Tone] : grossly normal strength/tone [No Rash] : no rash [Coordination Grossly Intact] : coordination grossly intact [No Focal Deficits] : no focal deficits [Normal Gait] : normal gait [Deep Tendon Reflexes (DTR)] : deep tendon reflexes were 2+ and symmetric [Normal Affect] : the affect was normal [Normal Insight/Judgement] : insight and judgment were intact [de-identified] : obese

## 2021-12-22 NOTE — PLAN
[FreeTextEntry1] : Cardiology\par elevated BP readings w/o Dx of HTN - continue low sodium diet; advised weight loss; will continue to monitor BP\par hyperlipidemia - continue Atorvastatin Calcium 20mg p.o.q.d. as directed, Rx filled - advised low cholesterol/low fat diet - check FLP and LFTs\par hypertriglyceridemia - advised low cholesterol/low fat and low carbohydrate/low sugar diet - check FLP \par Endocrinology\par hyperglycemia - advised low carbohydrate/low sugar diet - check hemoglobin A1C\par obesity - advised low carbohydrate diet; continue CV exercise\par elevated TSH - previously referred to endocrinologist, Dr. Hughes for further evaluation \par history of Vitamin D deficiency - continue Vitamin D 1000 IU BID p.o.q.d. with meals as directed - check Vitamin D\par Endocrinology/Musculoskeletal\par osteoporosis - previously recommended Os-Jw or Citracal p.o.q.d. as directed - previously referred to endocrinologist, Dr. David Hughes to further discuss treatment options\par Musculoskeletal\par discomfort around 1st digit of right foot - check Serum Uric Acid to r/o gout - also referred to podiatrists, Dr. Coyle/Dr. Lorenzo for further evaluation/treatment as the discomfort may be secondary to bunion \par Gastroenterology\par Previously referred to gastroenterologist, Dr. Perez for consultation prior to screening colonoscopy; again discussed FIT-DNA testing: kit given   \par Immunization/Infectious Disease\par Shingrix (2nd dose) - evidence of vaccine administration to be requested from patient's pharmacy\par S/p COVID-19 Vaccine (x3) - check COVID-19 Sai Domain Antibody \par \par check CMP, FLP, hemoglobin A1C, Vitamin D, Serum Uric Acid, and COVID-19 Sai Domain Antibody

## 2021-12-22 NOTE — HISTORY OF PRESENT ILLNESS
[FreeTextEntry1] : fasting blood work, Rx refill, and general follow-up\par  [de-identified] : Patient is a 60 year old female with a past medical history as below who presents for fasting blood work, Rx refill, and general follow-up. Patient is taking Atorvastatin Calcium as prescribed. Patient has never had a screening colonoscopy. She is willing to have FIT-DNA testing done. Patient has intermittently noted discomfort around the 1st digit of the right foot for the past month. She denies associated erythema, swelling, or heat. She denies consuming more protein than usual. She denies alcohol consumption. She denies any recent falls or trauma to the area. Patient notes recent weight gain. Patient states her son recently tested positive for COVID-19 infection. She notes following up at an urgent care facility and states she tested negative for COVID-19 infection. Patient has received the booster dose of the COVID-19 Vaccine. She inquires about testing for COVID-19 antibodies with blood work today to evaluate her response to the vaccine. Patient requests Rx refill for Atorvastatin Calcium.

## 2021-12-22 NOTE — ADDENDUM
[FreeTextEntry1] : I, Raffaele Crump, acted solely as scribe for Dr. David Sauer DO on this date 12/22/2021  9:00AM .\par \par All medical record entries made by the Scribe were at my, Dr. David Sauer DO direction and personally dictated by me on 12/22/2021  9:00AM. I have reviewed the chart and agree that the record accurately reflects my personal performance of the history, physical exam, assessment and plan. I have also personally directed, reviewed and agreed with the chart.\par

## 2021-12-28 ENCOUNTER — APPOINTMENT (OUTPATIENT)
Dept: DERMATOLOGY | Facility: CLINIC | Age: 61
End: 2021-12-28
Payer: MEDICAID

## 2021-12-28 PROCEDURE — 99204 OFFICE O/P NEW MOD 45 MIN: CPT

## 2021-12-29 LAB
25(OH)D3 SERPL-MCNC: 33.2 NG/ML
ALBUMIN SERPL ELPH-MCNC: 4.7 G/DL
ALP BLD-CCNC: 77 U/L
ALT SERPL-CCNC: 30 U/L
ANION GAP SERPL CALC-SCNC: 13 MMOL/L
AST SERPL-CCNC: 24 U/L
BILIRUB SERPL-MCNC: 0.8 MG/DL
BUN SERPL-MCNC: 21 MG/DL
CALCIUM SERPL-MCNC: 9.7 MG/DL
CHLORIDE SERPL-SCNC: 103 MMOL/L
CHOLEST SERPL-MCNC: 189 MG/DL
CO2 SERPL-SCNC: 25 MMOL/L
COVID-19 SPIKE DOMAIN ANTIBODY INTERPRETATION: POSITIVE
CREAT SERPL-MCNC: 0.89 MG/DL
ESTIMATED AVERAGE GLUCOSE: 117 MG/DL
GLUCOSE SERPL-MCNC: 97 MG/DL
HBA1C MFR BLD HPLC: 5.7 %
HDLC SERPL-MCNC: 57 MG/DL
LDLC SERPL CALC-MCNC: 118 MG/DL
NONHDLC SERPL-MCNC: 132 MG/DL
POTASSIUM SERPL-SCNC: 4.2 MMOL/L
PROT SERPL-MCNC: 7.2 G/DL
SARS-COV-2 AB SERPL IA-ACNC: >250 U/ML
SODIUM SERPL-SCNC: 141 MMOL/L
TRIGL SERPL-MCNC: 70 MG/DL

## 2022-01-24 ENCOUNTER — APPOINTMENT (OUTPATIENT)
Dept: INTERNAL MEDICINE | Facility: CLINIC | Age: 62
End: 2022-01-24

## 2022-02-16 ENCOUNTER — LABORATORY RESULT (OUTPATIENT)
Age: 62
End: 2022-02-16

## 2022-02-16 ENCOUNTER — APPOINTMENT (OUTPATIENT)
Dept: ENDOCRINOLOGY | Facility: CLINIC | Age: 62
End: 2022-02-16
Payer: MEDICAID

## 2022-02-16 VITALS
WEIGHT: 212 LBS | OXYGEN SATURATION: 99 % | HEART RATE: 79 BPM | BODY MASS INDEX: 30.42 KG/M2 | DIASTOLIC BLOOD PRESSURE: 82 MMHG | SYSTOLIC BLOOD PRESSURE: 128 MMHG | TEMPERATURE: 98 F

## 2022-02-16 VITALS — HEIGHT: 69 IN | BODY MASS INDEX: 31.31 KG/M2

## 2022-02-16 PROCEDURE — ZZZZZ: CPT

## 2022-02-16 PROCEDURE — 99204 OFFICE O/P NEW MOD 45 MIN: CPT | Mod: 25

## 2022-02-16 PROCEDURE — 77080 DXA BONE DENSITY AXIAL: CPT

## 2022-02-17 LAB
CALCIUM SERPL-MCNC: 10 MG/DL
PARATHYROID HORMONE INTACT: 34 PG/ML
T3RU NFR SERPL: 1.2 TBI
T4 SERPL-MCNC: 8.5 UG/DL
THYROGLOB AB SERPL-ACNC: <20 IU/ML
THYROPEROXIDASE AB SERPL IA-ACNC: <10 IU/ML
TSH SERPL-ACNC: 3.75 UIU/ML

## 2022-02-18 NOTE — REASON FOR VISIT
[Consultation] : a consultation visit [Osteoporosis] : osteoporosis [FreeTextEntry2] : David Sauer MD

## 2022-02-18 NOTE — PHYSICAL EXAM
[Alert] : alert [Well Nourished] : well nourished [No Acute Distress] : no acute distress [Well Developed] : well developed [Normal Sclera/Conjunctiva] : normal sclera/conjunctiva [EOMI] : extra ocular movement intact [No Proptosis] : no proptosis [No Thyroid Nodules] : no palpable thyroid nodules [Clear to Auscultation] : lungs were clear to auscultation bilaterally [Normal S1, S2] : normal S1 and S2 [Normal Rate] : heart rate was normal [Regular Rhythm] : with a regular rhythm [No Edema] : no peripheral edema [Normal Bowel Sounds] : normal bowel sounds [Not Tender] : non-tender [Not Distended] : not distended [Soft] : abdomen soft [Normal Anterior Cervical Nodes] : no anterior cervical lymphadenopathy [No Spinal Tenderness] : no spinal tenderness [Spine Straight] : spine straight [No Stigmata of Cushings Syndrome] : no stigmata of Cushings Syndrome [Normal Gait] : normal gait [Normal Reflexes] : deep tendon reflexes were 2+ and symmetric [No Tremors] : no tremors [Oriented x3] : oriented to person, place, and time [Hirsutism] : no hirsutism [de-identified] : thyroid fullness [de-identified] : normal hairline, no alopecia, no loss of hairline

## 2022-02-18 NOTE — ASSESSMENT
[Bisphosphonates] : The patient was instructed to take bisphosphonates on an empty stomach with a full glass of water,and wait at least 30 minutes before eating or lying down [Bisphosphonate Therapy] : Risks and benefits of bisphosphonate therapy were  discussed with the patient including gastroesophageal irritation, osteonecrosis of the jaw, and atypical femur fractures, and acute phase reaction [FreeTextEntry1] : Ms. DEMARCO is a 61 year old female w/ osteoporosis\par \par Pt states she was told of progression to osteoporosis after routine BMD 2020. She has not used any osteoporosis rx in the past. Fh/o osteoporosis in mother, no hip fx. H/o metatarsal fx 20+ years ago, wore boot only. No unusual risks for osteoporosis. Outside BMD indicates 2020 progression to osteoporosis in spine, worsened normal total hip, and worsened osteopenia in femoral neck. BMD 2/2022 in office indicates slightly worsened osteoporosis in spine, stable osteopenia in hip, and normal proximal radius. BMD results reviewed w/ pt.\par \par Patient advised for an increased risk of future fx. Options for medical therapy discussed in detail. I discussed rx with bisphosphonate therapy, including Fosamax, Actonel, and Boniva. Risks and benefits of bisphosphonate therapy were discussed with the patient including minor aches & pains, heartburn, gastroesophageal irritation, osteonecrosis of the jaw, and atypical femur fractures. Pt would benefit from bisphosphonate therapy with the expectation of a drug holiday within 5 years. All questions were answered. Rx information handout provided. Pt understands and elects to start Fosamax. Medication instructions reviewed. Prescription sent out.\par \par Recent labs indicate elevated TSH c/w subclinical hypothyroidism. Current recommendations concerning management of subclinical hypothyroidism reviewed in detail. If TSH remains only minimally elevated, there is no indication for beginning levothyroxine therapy. We will repeat TFT today.\par \par Thyroid hormones treatment for subclinical hypothyroidism:a clinical practice guideline. ISAÍAS Salcido et al BMJ 2019;[id number]:[id number]\par \par Hemoglobin A1c 5.7% shows stable prediabetes. Natural history of prediabetes discussed in detail. Pt advised re: watching weight, maintaining moderate to low carbohydrate intake. Controversy concerning use of metformin for prediabetes reviewed.\par \par H/o Vitamin D deficiency, currently on Vitamin D 2000 IU daily.\par \par I recommend pt take no more than 500 mg Ca in addition to dietary intake.\par \par Labs 12/2021 reviewed: Ca 9.7, normal. Vitamin D 33.2, normal. 2/2021 TSH 4.12, normal. 12/2021 TSH 5.93, elevated. Creatinine 0.89, normal.\par \par Request labs sent out. Will include repeat TFT.\par \par F/u in 4 months\par \par Pharmacological Management of Osteoporosis in Postmenopausal Women: An Endocrine Society  Clinical Practice Guideline. Tien R, Jennifer COYLE., Juan Antonio DM, Tash AM, Cyril MH, Stefanie D. JCEM 2019 104: [id number]

## 2022-02-18 NOTE — HISTORY OF PRESENT ILLNESS
[Calcium (dietary)] : dietary Calcium [Vitamin D (oral)] : Vitamin D orally [Low Vit D Intake/Exposure] : low vitamin D intake [Previous Fragility Fracture] : previous fragility fracture(s) [Family History of Osteoporosis] : family history of osteoporosis [FreeTextEntry1] : Pt states she was told of progression to osteoporosis after routine BMD 2020. She has not used any osteoporosis rx in the past. 0 falls in the last 6 months. Fh/o osteoporosis in mother, no hip fx. No fh/o breast CA. H/o metatarsal fx 20+ years ago, wore boot only. H/o Vitamin D deficiency, currently on Vitamin D 2000 IU daily. No h/o kidney stones or calcium problems. No h/o anorexia nervosa. No heart, lung, kidney, liver, stomach problems. No neurological or psychological problems. No ulcers or bleeding. No unusual risks for osteoporosis. No h/o RT. No chronic prednisone use. No h/o Paget's disease. No h/o DVT or PE. Menopause 49. No HRT use. Up to date with mammography and GYN. Diet includes moderate dairy intake.\par \par Bone mineral density: 2020\par Spine: -2.5 osteoporosis, decreased vs. prior study 2017 -2.3 osteopenia\par L Total hip: -1.0 normal, prior -0.5 normal\par L Femoral neck: -1.5 osteopenia, prior -1.2 osteopenia\par \par Pt reports new subclinical hypothyroidism, TSH 5.93, dx 12/2021 after pt saw dermatology Dr. Mike Figueroa for hair loss. No local neck pain. No dysphagia or dysphonia. No raciness, shakiness, heat/cold intolerance, tiredness, or fatigue. No palpitations, tremors, or sudden weight gain/loss. No h/o exposure to RT or drugs that induce thyroid disease such as lithium or amiodarone. No fh/o thyroid disease. [Low Calcium Intake] : no low calcium intake [Premat. Menopause (natural)] : no history of premature menopause [Premat. Menopause (surgery)] : no history of premature surgical menopause [Disordered Eating] : no history of disordered eating [Taking Steroids] : no history of taking steroids [Hyperparathyroidism] : no history of hyperparathyroidism [Diabetes] : no history of diabetes [Kidney Stones] : no history of kidney stones [Excessive Alcohol Intake] : no excessive alcohol intake [Excessive Soda] : no excessive soda [Sedentary] : no sedentary lifestyle [Current Smoking___(ppd)] : not currently smoking [Family History of Breast Cancer] : no family history of breast cancer [Family History of Hip Fracture] : no family history of hip fracture [History of Radiation Therapy] : no history of radiation therapy [History of Blood Clots] : no history of blood clots [High Fall Risk] : no fall risk [Frequent Falls] : no frequent falls [Uses a Walker/Cane] : no use of a walker/cane

## 2022-02-18 NOTE — END OF VISIT
[FreeTextEntry3] : I, Bienvenido Mendez, authored this note working as a medical scribe for Dr. Hughes.  02/16/2022. 10:30AM. This note was authored by the medical scribe for me. I have reviewed, edited, and revised the note as needed. I am in agreement with the exam findings, imaging findings, and treatment plan.  David Hughes MD

## 2022-02-18 NOTE — PROCEDURE
[FreeTextEntry1] : Bone mineral density: 02/16/2022 \par Indication: vs. outside study 2020 prior test showed bone loss\par Spine: -2.7 osteoporosis\par Total hip: -1.2 osteopenia\par Femoral neck: -1.5 osteopenia\par Proximal radius: -0.9 normal\par \par Thyroid US - 02/16/2022\par normal gland

## 2022-02-18 NOTE — CONSULT LETTER
[Dear  ___] : Dear  [unfilled], [Consult Letter:] : I had the pleasure of evaluating your patient, [unfilled]. [Please see my note below.] : Please see my note below. [Consult Closing:] : Thank you very much for allowing me to participate in the care of this patient.  If you have any questions, please do not hesitate to contact me. [FreeTextEntry2] : David Sauer MD

## 2022-03-27 ENCOUNTER — RESULT CHARGE (OUTPATIENT)
Age: 62
End: 2022-03-27

## 2022-03-29 ENCOUNTER — APPOINTMENT (OUTPATIENT)
Dept: INTERNAL MEDICINE | Facility: CLINIC | Age: 62
End: 2022-03-29
Payer: MEDICAID

## 2022-03-29 ENCOUNTER — NON-APPOINTMENT (OUTPATIENT)
Age: 62
End: 2022-03-29

## 2022-03-29 VITALS
DIASTOLIC BLOOD PRESSURE: 70 MMHG | RESPIRATION RATE: 16 BRPM | BODY MASS INDEX: 32.16 KG/M2 | HEART RATE: 75 BPM | HEIGHT: 69 IN | WEIGHT: 217.13 LBS | TEMPERATURE: 97.7 F | SYSTOLIC BLOOD PRESSURE: 122 MMHG | OXYGEN SATURATION: 98 %

## 2022-03-29 DIAGNOSIS — R76.8 OTHER SPECIFIED ABNORMAL IMMUNOLOGICAL FINDINGS IN SERUM: ICD-10-CM

## 2022-03-29 DIAGNOSIS — L65.9 NONSCARRING HAIR LOSS, UNSPECIFIED: ICD-10-CM

## 2022-03-29 PROCEDURE — 93000 ELECTROCARDIOGRAM COMPLETE: CPT | Mod: 59

## 2022-03-29 PROCEDURE — 99396 PREV VISIT EST AGE 40-64: CPT | Mod: 25

## 2022-03-29 NOTE — ASSESSMENT
[FreeTextEntry1] : Patient is a 61 year old female with a past medical history as above who presents for an annual wellness visit.\par

## 2022-03-29 NOTE — PHYSICAL EXAM
[No Acute Distress] : no acute distress [Well Nourished] : well nourished [Well Developed] : well developed [Well-Appearing] : well-appearing [Normal Sclera/Conjunctiva] : normal sclera/conjunctiva [PERRL] : pupils equal round and reactive to light [EOMI] : extraocular movements intact [Normal Outer Ear/Nose] : the outer ears and nose were normal in appearance [Normal Oropharynx] : the oropharynx was normal [No JVD] : no jugular venous distention [No Lymphadenopathy] : no lymphadenopathy [Supple] : supple [Thyroid Normal, No Nodules] : the thyroid was normal and there were no nodules present [No Respiratory Distress] : no respiratory distress  [No Accessory Muscle Use] : no accessory muscle use [Clear to Auscultation] : lungs were clear to auscultation bilaterally [Normal Rate] : normal rate  [Regular Rhythm] : with a regular rhythm [Normal S1, S2] : normal S1 and S2 [No Murmur] : no murmur heard [No Carotid Bruits] : no carotid bruits [No Abdominal Bruit] : a ~M bruit was not heard ~T in the abdomen [No Varicosities] : no varicosities [Pedal Pulses Present] : the pedal pulses are present [No Edema] : there was no peripheral edema [No Palpable Aorta] : no palpable aorta [No Extremity Clubbing/Cyanosis] : no extremity clubbing/cyanosis [Soft] : abdomen soft [Non Tender] : non-tender [Non-distended] : non-distended [No Masses] : no abdominal mass palpated [No HSM] : no HSM [Normal Bowel Sounds] : normal bowel sounds [Normal Posterior Cervical Nodes] : no posterior cervical lymphadenopathy [Normal Anterior Cervical Nodes] : no anterior cervical lymphadenopathy [No CVA Tenderness] : no CVA  tenderness [No Spinal Tenderness] : no spinal tenderness [No Joint Swelling] : no joint swelling [Grossly Normal Strength/Tone] : grossly normal strength/tone [No Rash] : no rash [Coordination Grossly Intact] : coordination grossly intact [No Focal Deficits] : no focal deficits [Normal Gait] : normal gait [Deep Tendon Reflexes (DTR)] : deep tendon reflexes were 2+ and symmetric [Normal Affect] : the affect was normal [Normal Insight/Judgement] : insight and judgment were intact [de-identified] : obese

## 2022-03-29 NOTE — HISTORY OF PRESENT ILLNESS
[FreeTextEntry1] : annual wellness visit [de-identified] : Patient is a 61 year old female with a past medical history as below who presents for an annual wellness visit. Patient is taking Atorvastatin Calcium daily as prescribed and denies any new arthralgias or myalgias on the medication. Patient has seen her GYN, Dr. Robert in the past year; no results for pap smear secondary to insufficient cell count. Patient's last breast imaging (Mammogram/Breast US) was in December 2021. Patient's last bone density testing was in February 2022; revealed osteoporosis. Endocrinologist, Dr. Hughes prescribed Alendronate Sodium, but the patient has not started the medication. Patient has never had a screening colonoscopy. She is willing to have FIT-DNA testing done. Patient notes recently seeing rheumatologist, Dr. Goldberg regarding positive LASHAY. Subsequent blood work ordered by Dr. Goldberg was WNL; no evidence of rheumatic disease. Patient has noted hair loss over the course of the past year. Her dermatologist prescribed Spironolactone, but she never started the medication; Dr. Hughes recommended starting Rogaine. She has been taking OTC Biotin/B-Complex. Patient received the flu vaccine for this season in October 2021. She received the Tdap (Adacel) Vaccine on 2/28/17. She has received both doses of the Shingles (Shingrix) Vaccine. She has received the booster dose of the COVID-19 Vaccine (Pfizer). Patient requests Rx refill for Atorvastatin Calcium.

## 2022-03-29 NOTE — ADDENDUM
[FreeTextEntry1] : I, Raffaele Crump, acted solely as scribe for Dr. David Sauer DO on this date 03/29/2022  9:00AM .\par \par All medical record entries made by the Scribe were at my, Dr. David Sauer DO direction and personally dictated by me on 03/29/2022  9:00AM. I have reviewed the chart and agree that the record accurately reflects my personal performance of the history, physical exam, assessment and plan. I have also personally directed, reviewed and agreed with the chart.\par

## 2022-03-29 NOTE — PLAN
[FreeTextEntry1] : Cardiology\par hyperlipidemia - continue Atorvastatin Calcium 40mg p.o.q.d. as directed, Rx filled - advised low cholesterol/low fat diet - check FLP and LFTs\par hypertriglyceridemia - advised low cholesterol/low fat and low carbohydrate/low sugar diet - check FLP \par Endocrinology\par hyperglycemia - advised low carbohydrate/low sugar diet - check hemoglobin A1C\par obesity - advised low carbohydrate diet; continue CV exercise\par history of Vitamin D deficiency - continue Vitamin D 1000 IU BID p.o.q.d. with a meal as directed - check Vitamin D\par Endocrinology/Musculoskeletal\par osteoporosis - start Alendronate Sodium 70mg p.o.q.w. as directed; continue Vitamin D 1000 IU BID p.o.q.d. with a meal as directed; previously advised to start Calcium 500mg daily - continue to follow up with endocrinologist, Dr. Hughes\par Gastroenterology\par Previously referred to gastroenterologist, Dr. Perez for consultation prior to screening colonoscopy; again discussed FIT-DNA testing: kit given   \par Gynecology\par Patient to see new GYN this year; results of pap smear to be requested \par Integumentary\par hair loss - referred to dermatologist, Dr. Uribe for further evaluation/treatment (second opinion)\par Immunization\par Shingrix (2nd dose) - evidence of vaccine administration to be requested from patient's pharmacy \par \par check EKG (results as above), female panel, hemoglobin A1C, and UA w/ Reflex Urine Culture

## 2022-03-29 NOTE — HEALTH RISK ASSESSMENT
[Never] : Never [Yes] : Yes [2 - 4 times a month (2 pts)] : 2-4 times a month (2 points) [1 or 2 (0 pts)] : 1 or 2 (0 points) [Never (0 pts)] : Never (0 points) [No] : In the past 12 months have you used drugs other than those required for medical reasons? No [0] : 2) Feeling down, depressed, or hopeless: Not at all (0) [PHQ-2 Negative - No further assessment needed] : PHQ-2 Negative - No further assessment needed [Patient reported PAP Smear was normal] : Patient reported PAP Smear was normal [Audit-CScore] : 2 [de-identified] : Treadmill.  [de-identified] : Low carb/low sugar.  [[id number]] : 0 [MammogramDate] : 12/21 [MammogramComments] : No mammographic or ultrasonographic evidence of malignancy; BI-RADS Category 1: Negative. [PapSmearDate] : 01/21 [BoneDensityDate] : 02/22 [BoneDensityComments] : Osteoporosis.  [ColonoscopyComments] : Previously referred to Dr. Perez for consultation; again discussed FIT-DNA testing: kit given.

## 2022-03-31 ENCOUNTER — NON-APPOINTMENT (OUTPATIENT)
Age: 62
End: 2022-03-31

## 2022-03-31 LAB
25(OH)D3 SERPL-MCNC: 35.1 NG/ML
ALBUMIN SERPL ELPH-MCNC: 4.9 G/DL
ALP BLD-CCNC: 68 U/L
ALT SERPL-CCNC: 20 U/L
ANION GAP SERPL CALC-SCNC: 11 MMOL/L
APPEARANCE: CLEAR
AST SERPL-CCNC: 22 U/L
BASOPHILS # BLD AUTO: 0.03 K/UL
BASOPHILS NFR BLD AUTO: 0.6 %
BILIRUB SERPL-MCNC: 0.7 MG/DL
BILIRUBIN URINE: NEGATIVE
BLOOD URINE: NEGATIVE
BUN SERPL-MCNC: 16 MG/DL
CALCIUM SERPL-MCNC: 9.6 MG/DL
CHLORIDE SERPL-SCNC: 104 MMOL/L
CHOLEST SERPL-MCNC: 150 MG/DL
CO2 SERPL-SCNC: 27 MMOL/L
COLOR: YELLOW
CREAT SERPL-MCNC: 0.98 MG/DL
EGFR: 66 ML/MIN/1.73M2
EOSINOPHIL # BLD AUTO: 0.15 K/UL
EOSINOPHIL NFR BLD AUTO: 2.9 %
ESTIMATED AVERAGE GLUCOSE: 117 MG/DL
GLUCOSE QUALITATIVE U: NEGATIVE
GLUCOSE SERPL-MCNC: 93 MG/DL
HBA1C MFR BLD HPLC: 5.7 %
HCT VFR BLD CALC: 44.6 %
HDLC SERPL-MCNC: 54 MG/DL
HGB BLD-MCNC: 14.1 G/DL
IMM GRANULOCYTES NFR BLD AUTO: 0.4 %
KETONES URINE: NEGATIVE
LDLC SERPL CALC-MCNC: 84 MG/DL
LEUKOCYTE ESTERASE URINE: NEGATIVE
LYMPHOCYTES # BLD AUTO: 1.97 K/UL
LYMPHOCYTES NFR BLD AUTO: 38.1 %
MAN DIFF?: NORMAL
MCHC RBC-ENTMCNC: 31.3 PG
MCHC RBC-ENTMCNC: 31.6 GM/DL
MCV RBC AUTO: 98.9 FL
MONOCYTES # BLD AUTO: 0.3 K/UL
MONOCYTES NFR BLD AUTO: 5.8 %
NEUTROPHILS # BLD AUTO: 2.7 K/UL
NEUTROPHILS NFR BLD AUTO: 52.2 %
NITRITE URINE: NEGATIVE
NONHDLC SERPL-MCNC: 96 MG/DL
PH URINE: 6
PLATELET # BLD AUTO: 279 K/UL
POTASSIUM SERPL-SCNC: 4.1 MMOL/L
PROT SERPL-MCNC: 7.2 G/DL
PROTEIN URINE: NORMAL
RBC # BLD: 4.51 M/UL
RBC # FLD: 12.7 %
SODIUM SERPL-SCNC: 142 MMOL/L
SPECIFIC GRAVITY URINE: 1.02
TRIGL SERPL-MCNC: 64 MG/DL
TSH SERPL-ACNC: 5.35 UIU/ML
UROBILINOGEN URINE: NORMAL
WBC # FLD AUTO: 5.17 K/UL

## 2022-04-24 NOTE — DATA REVIEWED
Pt reports left chest pain that travels into her left neck.    [No studies available for review at this time.] : No studies available for review at this time.

## 2022-06-30 ENCOUNTER — APPOINTMENT (OUTPATIENT)
Dept: ORTHOPEDIC SURGERY | Facility: CLINIC | Age: 62
End: 2022-06-30

## 2022-06-30 VITALS — WEIGHT: 213 LBS | HEIGHT: 69 IN | BODY MASS INDEX: 31.55 KG/M2

## 2022-06-30 DIAGNOSIS — M50.320 OTHER CERVICAL DISC DEGENERATION, MID-CERVICAL REGION, UNSPECIFIED LEVEL: ICD-10-CM

## 2022-06-30 DIAGNOSIS — M79.641 PAIN IN RIGHT HAND: ICD-10-CM

## 2022-06-30 DIAGNOSIS — M79.642 PAIN IN RIGHT HAND: ICD-10-CM

## 2022-06-30 DIAGNOSIS — M54.12 RADICULOPATHY, CERVICAL REGION: ICD-10-CM

## 2022-06-30 PROCEDURE — 72040 X-RAY EXAM NECK SPINE 2-3 VW: CPT

## 2022-06-30 PROCEDURE — 73130 X-RAY EXAM OF HAND: CPT | Mod: 50

## 2022-06-30 PROCEDURE — 99203 OFFICE O/P NEW LOW 30 MIN: CPT

## 2022-06-30 NOTE — IMAGING
[de-identified] : negative tinnells at the wrists and elbows, neg phalens test [FreeTextEntry1] : both hands neg xrays

## 2022-06-30 NOTE — HISTORY OF PRESENT ILLNESS
[Occasional] : occasional [de-identified] : 6-30-22- She is a right hand dominant female with 2+ month h/o tingling and pain in the 2/3/4/5 digits of both hands. she says she wakes up with the pain in the morning usually does not actually wake her. as the day goes on symptoms improve.\par \par she does note + LASHAY but has not ruled in for any specific rheumatologic diagnoses, \par also pmh osteoporosis  [] : no [FreeTextEntry1] : b/l hands  [FreeTextEntry3] : about 2 months  [FreeTextEntry5] : patient is feeling pain and discomfort in the hands

## 2022-06-30 NOTE — PHYSICAL EXAM
[Flexion] : flexion [Extension] : extension [Rotation to left] : rotation to left [Rotation to right] : rotation to right [Straightening consistent with spasm] : Straightening consistent with spasm [Disc space narrowing] : Disc space narrowing [5___] : pinch 5[unfilled]/5 [Bilateral] : hand bilaterally [There are no fractures, subluxations or dislocations. No significant abnormalities are seen] : There are no fractures, subluxations or dislocations. No significant abnormalities are seen [de-identified] : she notes pain and tingling into the 2-3-4-5 fingers in both hands [] : negative Phalen's

## 2022-06-30 NOTE — DISCUSSION/SUMMARY
[de-identified] : Discussed possible EMGs. Will start her on Meloxicam first to see if that helps her symptoms

## 2022-07-21 ENCOUNTER — APPOINTMENT (OUTPATIENT)
Dept: ORTHOPEDIC SURGERY | Facility: CLINIC | Age: 62
End: 2022-07-21

## 2022-07-21 DIAGNOSIS — M25.561 PAIN IN RIGHT KNEE: ICD-10-CM

## 2022-07-21 PROCEDURE — 99213 OFFICE O/P EST LOW 20 MIN: CPT | Mod: 25

## 2022-07-21 PROCEDURE — 73560 X-RAY EXAM OF KNEE 1 OR 2: CPT | Mod: RT

## 2022-07-21 PROCEDURE — 20610 DRAIN/INJ JOINT/BURSA W/O US: CPT

## 2022-07-21 NOTE — PHYSICAL EXAM
[5___] : hamstring 5[unfilled]/5 [Equivocal] : equivocal Jennifer [Right] : right knee [AP] : anteroposterior [Lateral] : lateral [Moderate tricompartmental OA medial narrowing] : Moderate tricompartmental OA medial narrowing [] : no anterior pain with squatting [TWNoteComboBox7] : flexion 120 degrees [de-identified] : extension 3 degrees

## 2022-07-21 NOTE — HISTORY OF PRESENT ILLNESS
[de-identified] : 61 year old F p/w RIGHT knee pain - ongoing x a couple years. She developed pain over the past couple weeks, sharp in nature. Feels like a "jolt" to the back of her knee. Standing prolonged aggravates. Intermittent pain with stairs. + mechanical symptoms. She has tried several knee supports. Ankle swells now with use. + clicking.  No recent tx.\par \par

## 2022-07-28 ENCOUNTER — APPOINTMENT (OUTPATIENT)
Dept: ORTHOPEDIC SURGERY | Facility: CLINIC | Age: 62
End: 2022-07-28

## 2022-07-29 ENCOUNTER — APPOINTMENT (OUTPATIENT)
Dept: ORTHOPEDIC SURGERY | Facility: CLINIC | Age: 62
End: 2022-07-29

## 2022-08-07 ENCOUNTER — RX RENEWAL (OUTPATIENT)
Age: 62
End: 2022-08-07

## 2022-08-11 ENCOUNTER — APPOINTMENT (OUTPATIENT)
Dept: ORTHOPEDIC SURGERY | Facility: CLINIC | Age: 62
End: 2022-08-11

## 2022-08-11 PROCEDURE — 99213 OFFICE O/P EST LOW 20 MIN: CPT

## 2022-08-11 NOTE — PHYSICAL EXAM
[5___] : hamstring 5[unfilled]/5 [Right] : right knee [AP] : anteroposterior [Lateral] : lateral [Moderate tricompartmental OA medial narrowing] : Moderate tricompartmental OA medial narrowing [Negative] : negative Norberto's [] : no anterior pain with squatting [TWNoteComboBox7] : flexion 125 degrees [de-identified] : extension 0 degrees

## 2022-08-11 NOTE — HISTORY OF PRESENT ILLNESS
[de-identified] : 61 year old F p/w RIGHT knee pain - ongoing x a couple years. She developed pain over the past couple weeks, sharp in nature. Feels like a "jolt" to the back of her knee. Standing prolonged aggravates. Intermittent pain with stairs. + mechanical symptoms. She has tried several knee supports. Ankle swells now with use. + clicking.  No recent tx.\par \par

## 2022-09-14 ENCOUNTER — LABORATORY RESULT (OUTPATIENT)
Age: 62
End: 2022-09-14

## 2022-09-14 ENCOUNTER — APPOINTMENT (OUTPATIENT)
Dept: INTERNAL MEDICINE | Facility: CLINIC | Age: 62
End: 2022-09-14

## 2022-09-14 VITALS
BODY MASS INDEX: 32.05 KG/M2 | OXYGEN SATURATION: 97 % | HEART RATE: 82 BPM | HEIGHT: 69 IN | WEIGHT: 216.38 LBS | RESPIRATION RATE: 16 BRPM | SYSTOLIC BLOOD PRESSURE: 128 MMHG | TEMPERATURE: 97.3 F | DIASTOLIC BLOOD PRESSURE: 70 MMHG

## 2022-09-14 DIAGNOSIS — M17.11 UNILATERAL PRIMARY OSTEOARTHRITIS, RIGHT KNEE: ICD-10-CM

## 2022-09-14 DIAGNOSIS — Z12.11 ENCOUNTER FOR SCREENING FOR MALIGNANT NEOPLASM OF COLON: ICD-10-CM

## 2022-09-14 PROCEDURE — 36415 COLL VENOUS BLD VENIPUNCTURE: CPT

## 2022-09-14 PROCEDURE — 99214 OFFICE O/P EST MOD 30 MIN: CPT | Mod: 25

## 2022-09-14 NOTE — ASSESSMENT
[FreeTextEntry1] : Patient is a 61 year old female with a past medical history as above who presents for fasting blood work, Rx refill, and general follow-up.

## 2022-09-14 NOTE — PLAN
[FreeTextEntry1] : Cardiology\par hyperlipidemia - continue Atorvastatin Calcium 40mg p.o.q.d. as directed, Rx filled - advised low cholesterol/low fat diet - check FLP and LFTs\par history of hypertriglyceridemia - advised low cholesterol/low fat and low carbohydrate/low sugar diet - check FLP \par Endocrinology\par elevated TSH - check thyroid panel \par hyperglycemia - advised low carbohydrate/low sugar diet; continue CV exercise - check hemoglobin A1C\par obesity - advised low carbohydrate diet; continue CV exercise\par history of Vitamin D deficiency - continue Vitamin D 1000 IU BID p.o.q.d. with a meal as directed - check Vitamin D\par Endocrinology/Musculoskeletal\par osteoporosis - recommended starting Alendronate Sodium 70mg p.o.q.w. as directed; continue Vitamin D 1000 IU BID p.o.q.d. with a meal as directed; previously advised to start Calcium 500mg daily - continue to follow up with endocrinologist, Dr. Hughes\par Musculoskeletal\par prior complaint of discomfort in the 1st digit of the right foot - check Serum Uric Acid\par right knee pain - secondary to arthritis - recommended discontinuing PT given worsening symptoms - follow up with orthopedist, Dr. Alicea as necessary\par Gastroenterology\par Previously referred to gastroenterologist, Dr. Perez for consultation prior to screening colonoscopy; again discussed FIT testing: kit given   \par Gynecology\par Results of most recent pap smear to be requested from GYN's office\par Immunization\par Patient to receive flu vaccine in October 2022\par Shingrix (2nd dose) - evidence of vaccine administration to be requested from patient's pharmacy \par Recommended following up at pharmacy for updated COVID-19 vaccine booster\par \par check CMP, FLP, hemoglobin A1C, thyroid panel, Vitamin D, and Serum Uric Acid

## 2022-09-14 NOTE — PHYSICAL EXAM
[No Acute Distress] : no acute distress [Well Nourished] : well nourished [Well Developed] : well developed [Well-Appearing] : well-appearing [Normal Sclera/Conjunctiva] : normal sclera/conjunctiva [PERRL] : pupils equal round and reactive to light [EOMI] : extraocular movements intact [Normal Outer Ear/Nose] : the outer ears and nose were normal in appearance [Normal Oropharynx] : the oropharynx was normal [No JVD] : no jugular venous distention [No Lymphadenopathy] : no lymphadenopathy [Supple] : supple [Thyroid Normal, No Nodules] : the thyroid was normal and there were no nodules present [No Respiratory Distress] : no respiratory distress  [No Accessory Muscle Use] : no accessory muscle use [Clear to Auscultation] : lungs were clear to auscultation bilaterally [Normal Rate] : normal rate  [Regular Rhythm] : with a regular rhythm [Normal S1, S2] : normal S1 and S2 [No Murmur] : no murmur heard [No Carotid Bruits] : no carotid bruits [No Abdominal Bruit] : a ~M bruit was not heard ~T in the abdomen [No Varicosities] : no varicosities [Pedal Pulses Present] : the pedal pulses are present [No Edema] : there was no peripheral edema [No Palpable Aorta] : no palpable aorta [No Extremity Clubbing/Cyanosis] : no extremity clubbing/cyanosis [Soft] : abdomen soft [Non Tender] : non-tender [Non-distended] : non-distended [No Masses] : no abdominal mass palpated [No HSM] : no HSM [Normal Bowel Sounds] : normal bowel sounds [Normal Posterior Cervical Nodes] : no posterior cervical lymphadenopathy [Normal Anterior Cervical Nodes] : no anterior cervical lymphadenopathy [No CVA Tenderness] : no CVA  tenderness [No Spinal Tenderness] : no spinal tenderness [No Joint Swelling] : no joint swelling [Grossly Normal Strength/Tone] : grossly normal strength/tone [No Rash] : no rash [Coordination Grossly Intact] : coordination grossly intact [No Focal Deficits] : no focal deficits [Normal Gait] : normal gait [Deep Tendon Reflexes (DTR)] : deep tendon reflexes were 2+ and symmetric [Normal Affect] : the affect was normal [Normal Insight/Judgement] : insight and judgment were intact [Normal Voice/Communication] : normal voice/communication [Normal TMs] : both tympanic membranes were normal [Normal Nasal Mucosa] : the nasal mucosa was normal [No Hernias] : no hernias [Normal Supraclavicular Nodes] : no supraclavicular lymphadenopathy [Kyphosis] : no kyphosis [Scoliosis] : no scoliosis [Speech Grossly Normal] : speech grossly normal [Memory Grossly Normal] : memory grossly normal [Alert and Oriented x3] : oriented to person, place, and time [Normal Mood] : the mood was normal [de-identified] : obese [de-identified] : no pain with varus or valgus strain of right knee joint; negative anterior/posterior Drawers sign

## 2022-09-14 NOTE — REVIEW OF SYSTEMS
[Negative] : Heme/Lymph [Joint Pain] : joint pain [Joint Swelling] : joint swelling [Hair Changes] : hair changes [FreeTextEntry9] : right knee pain/swelling; right knee "clicking"; RLE weakness  [de-identified] : hair loss

## 2022-09-14 NOTE — HEALTH RISK ASSESSMENT
[Never] : Never [Yes] : Yes [2 - 4 times a month (2 pts)] : 2-4 times a month (2 points) [1 or 2 (0 pts)] : 1 or 2 (0 points) [Never (0 pts)] : Never (0 points) [No] : In the past 12 months have you used drugs other than those required for medical reasons? No [0] : 2) Feeling down, depressed, or hopeless: Not at all (0) [PHQ-2 Negative - No further assessment needed] : PHQ-2 Negative - No further assessment needed [Patient reported PAP Smear was normal] : Patient reported PAP Smear was normal [Audit-CScore] : 2 [de-identified] : Treadmill.  [de-identified] : Low carb/low sugar.  [[id number]] : 0 [MammogramDate] : 12/21 [MammogramComments] : No mammographic or ultrasonographic evidence of malignancy; BI-RADS Category 1: Negative. [PapSmearDate] : 01/21 [BoneDensityDate] : 02/22 [BoneDensityComments] : Osteoporosis.  [ColonoscopyComments] : Previously referred to Dr. Perez for consultation; again discussed FIT testing: kit given.

## 2022-09-14 NOTE — ADDENDUM
[FreeTextEntry1] : I, Raffaele Crump, acted solely as scribe for Dr. David Sauer DO on this date 09/14/2022 11:50AM .\par \par All medical record entries made by the Scribe were at my, Dr. David Sauer DO direction and personally dictated by me on 09/14/2022 11:50AM. I have reviewed the chart and agree that the record accurately reflects my personal performance of the history, physical exam, assessment and plan. I have also personally directed, reviewed and agreed with the chart.

## 2022-09-14 NOTE — HISTORY OF PRESENT ILLNESS
[FreeTextEntry1] : fasting blood work, Rx refill, and general follow-up [de-identified] : Patient is a 61 year old female with a past medical history as below who presents for fasting blood work, Rx refill, and general follow-up. Patient is taking Atorvastatin Calcium 40mg daily as prescribed.\par \par Blood work done on 3/29/22 revealed elevated TSH (5.35).\par \par Patient has never had a screening colonoscopy. She again inquires about FIT Test.\par \par DEXA Scan dated 2/16/22 revealed osteoporosis. Patient has not started Alendronate Sodium (Fosamax).  \par \par Since last visit, patient notes seeing orthopedist Dr. Alicea; c/o right knee pain/swelling, s/p cortisone injection into right knee joint. She states the pain/swelling have worsened since starting PT. She notes right knee "clicking". She intermittently notes RLE weakness.\par \par Patient had noted discomfort in the 1st digit of the right foot on 12/22/21.\par \par Patient started Vitamin B Complex for hair loss. \par \par Patient will be receiving the flu vaccine in October 2022. Patient is vaccinated against COVID-19.\par \par Patient requests Rx refill for Atorvastatin Calcium.

## 2022-09-18 LAB
25(OH)D3 SERPL-MCNC: 36 NG/ML
ALBUMIN SERPL ELPH-MCNC: 4.8 G/DL
ALP BLD-CCNC: 67 U/L
ALT SERPL-CCNC: 49 U/L
ANION GAP SERPL CALC-SCNC: 12 MMOL/L
AST SERPL-CCNC: 36 U/L
BILIRUB SERPL-MCNC: 0.9 MG/DL
BUN SERPL-MCNC: 21 MG/DL
CALCIUM SERPL-MCNC: 9.8 MG/DL
CHLORIDE SERPL-SCNC: 104 MMOL/L
CHOLEST SERPL-MCNC: 208 MG/DL
CO2 SERPL-SCNC: 25 MMOL/L
CREAT SERPL-MCNC: 0.99 MG/DL
EGFR: 65 ML/MIN/1.73M2
ESTIMATED AVERAGE GLUCOSE: 117 MG/DL
GLUCOSE SERPL-MCNC: 92 MG/DL
HBA1C MFR BLD HPLC: 5.7 %
HDLC SERPL-MCNC: 57 MG/DL
LDLC SERPL CALC-MCNC: 127 MG/DL
NONHDLC SERPL-MCNC: 151 MG/DL
POTASSIUM SERPL-SCNC: 4.5 MMOL/L
PROT SERPL-MCNC: 7 G/DL
SODIUM SERPL-SCNC: 142 MMOL/L
T3 SERPL-MCNC: 135 NG/DL
T3FREE SERPL-MCNC: 3.05 PG/ML
T3RU NFR SERPL: 1.2 TBI
T4 FREE SERPL-MCNC: 1.2 NG/DL
T4 SERPL-MCNC: 8.5 UG/DL
THYROGLOB AB SERPL-ACNC: <20 IU/ML
THYROPEROXIDASE AB SERPL IA-ACNC: <10 IU/ML
TRIGL SERPL-MCNC: 123 MG/DL
TSH SERPL-ACNC: 2.22 UIU/ML

## 2022-09-20 ENCOUNTER — TRANSCRIPTION ENCOUNTER (OUTPATIENT)
Age: 62
End: 2022-09-20

## 2022-10-13 ENCOUNTER — APPOINTMENT (OUTPATIENT)
Dept: ORTHOPEDIC SURGERY | Facility: CLINIC | Age: 62
End: 2022-10-13

## 2022-10-31 ENCOUNTER — LABORATORY RESULT (OUTPATIENT)
Age: 62
End: 2022-10-31

## 2022-11-03 ENCOUNTER — NON-APPOINTMENT (OUTPATIENT)
Age: 62
End: 2022-11-03

## 2022-12-07 ENCOUNTER — NON-APPOINTMENT (OUTPATIENT)
Age: 62
End: 2022-12-07

## 2022-12-12 ENCOUNTER — APPOINTMENT (OUTPATIENT)
Dept: ENDOCRINOLOGY | Facility: CLINIC | Age: 62
End: 2022-12-12

## 2022-12-20 ENCOUNTER — APPOINTMENT (OUTPATIENT)
Dept: ORTHOPEDIC SURGERY | Facility: CLINIC | Age: 62
End: 2022-12-20

## 2022-12-28 ENCOUNTER — APPOINTMENT (OUTPATIENT)
Dept: INTERNAL MEDICINE | Facility: CLINIC | Age: 62
End: 2022-12-28
Payer: MEDICAID

## 2022-12-28 VITALS
OXYGEN SATURATION: 97 % | BODY MASS INDEX: 33.47 KG/M2 | HEART RATE: 109 BPM | SYSTOLIC BLOOD PRESSURE: 132 MMHG | DIASTOLIC BLOOD PRESSURE: 82 MMHG | HEIGHT: 69 IN | TEMPERATURE: 97.2 F | WEIGHT: 226 LBS | RESPIRATION RATE: 16 BRPM

## 2022-12-28 DIAGNOSIS — Z71.89 OTHER SPECIFIED COUNSELING: ICD-10-CM

## 2022-12-28 DIAGNOSIS — U07.1 COVID-19: ICD-10-CM

## 2022-12-28 PROCEDURE — 99214 OFFICE O/P EST MOD 30 MIN: CPT | Mod: 25

## 2022-12-28 NOTE — ADDENDUM
[FreeTextEntry1] : I, Raffaele Crump, acted solely as scribe for Dr. David Sauer, DO this date 12/28/2022  1:10PM .\par \par All medical record entries made by the Scribe were at my, Dr. David Sauer, DO direction and personally dictated by me on 12/28/2022  1:10PM. I have reviewed the chart and agree that the record accurately reflects my personal performance of the history, physical exam, assessment and plan. I have also personally directed, reviewed and agreed with the chart.

## 2022-12-28 NOTE — REVIEW OF SYSTEMS
[Negative] : Heme/Lymph [Fatigue] : fatigue [Shortness Of Breath] : shortness of breath [FreeTextEntry4] : ear popping [FreeTextEntry5] : chest heaviness

## 2022-12-28 NOTE — HEALTH RISK ASSESSMENT
[Never] : Never [Yes] : Yes [2 - 4 times a month (2 pts)] : 2-4 times a month (2 points) [1 or 2 (0 pts)] : 1 or 2 (0 points) [Never (0 pts)] : Never (0 points) [No] : In the past 12 months have you used drugs other than those required for medical reasons? No [0] : 2) Feeling down, depressed, or hopeless: Not at all (0) [PHQ-2 Negative - No further assessment needed] : PHQ-2 Negative - No further assessment needed [Patient reported PAP Smear was normal] : Patient reported PAP Smear was normal [Audit-CScore] : 2 [de-identified] : Treadmill.  [de-identified] : Low carb/low sugar.  [[id number]] : 0 [MammogramDate] : 12/22 [MammogramComments] : No mammographic or ultrasonographic evidence of malignancy; BI-RADS Category 1: Negative. [PapSmearDate] : 01/21 [BoneDensityDate] : 02/22 [BoneDensityComments] : Osteoporosis.  [ColonoscopyComments] : Previously referred to Dr. Perez for consultation; IFOBT dated 10/31/22 was negative.

## 2022-12-28 NOTE — HISTORY OF PRESENT ILLNESS
[FreeTextEntry1] : fasting blood work and general follow-up [de-identified] : Patient is a 61 year old female with a past medical history as below who presents for fasting blood work and general follow-up.\par \par Patient denies any new arthralgias or myalgias on Atorvastatin Calcium. She has been taking Ezetimibe (Zetia) daily as prescribed.\par \par Patient will be seeing GYN in January 2023 for her annual visit.\par \par Patient has not received the bivalent COVID-19 vaccine booster. Patient notes history of COVID-19 infection (early December 2022). She had noted mild symptoms.\par Patient has noted mild chest heaviness, mild SOB, ear popping, and fatigue since COVID-19 infection.

## 2022-12-28 NOTE — ASSESSMENT
[FreeTextEntry1] : Patient is a 61 year old female with a past medical history as above who presents for fasting blood work and general follow-up.

## 2022-12-28 NOTE — PHYSICAL EXAM
[No Acute Distress] : no acute distress [Well Nourished] : well nourished [Well Developed] : well developed [Well-Appearing] : well-appearing [Normal Sclera/Conjunctiva] : normal sclera/conjunctiva [PERRL] : pupils equal round and reactive to light [EOMI] : extraocular movements intact [Normal Outer Ear/Nose] : the outer ears and nose were normal in appearance [No JVD] : no jugular venous distention [No Lymphadenopathy] : no lymphadenopathy [Supple] : supple [Thyroid Normal, No Nodules] : the thyroid was normal and there were no nodules present [No Respiratory Distress] : no respiratory distress  [No Accessory Muscle Use] : no accessory muscle use [Clear to Auscultation] : lungs were clear to auscultation bilaterally [Normal Rate] : normal rate  [Regular Rhythm] : with a regular rhythm [Normal S1, S2] : normal S1 and S2 [No Murmur] : no murmur heard [No Carotid Bruits] : no carotid bruits [No Abdominal Bruit] : a ~M bruit was not heard ~T in the abdomen [No Varicosities] : no varicosities [Pedal Pulses Present] : the pedal pulses are present [No Edema] : there was no peripheral edema [No Palpable Aorta] : no palpable aorta [Soft] : abdomen soft [Non Tender] : non-tender [Non-distended] : non-distended [No Masses] : no abdominal mass palpated [No HSM] : no HSM [Normal Bowel Sounds] : normal bowel sounds [Normal Posterior Cervical Nodes] : no posterior cervical lymphadenopathy [Normal Anterior Cervical Nodes] : no anterior cervical lymphadenopathy [No CVA Tenderness] : no CVA  tenderness [No Spinal Tenderness] : no spinal tenderness [No Joint Swelling] : no joint swelling [Grossly Normal Strength/Tone] : grossly normal strength/tone [No Rash] : no rash [Coordination Grossly Intact] : coordination grossly intact [No Focal Deficits] : no focal deficits [Normal Gait] : normal gait [Deep Tendon Reflexes (DTR)] : deep tendon reflexes were 2+ and symmetric [Normal Affect] : the affect was normal [Normal Insight/Judgement] : insight and judgment were intact [Normal Voice/Communication] : normal voice/communication [Normal TMs] : both tympanic membranes were normal [Normal Nasal Mucosa] : the nasal mucosa was normal [Normal Supraclavicular Nodes] : no supraclavicular lymphadenopathy [Kyphosis] : kyphosis [Acne] : no acne [Speech Grossly Normal] : speech grossly normal [Memory Grossly Normal] : memory grossly normal [Alert and Oriented x3] : oriented to person, place, and time [Normal Mood] : the mood was normal [de-identified] : post-nasal drip  [de-identified] : obese

## 2022-12-28 NOTE — PLAN
[FreeTextEntry1] : Cardiology\par hyperlipidemia - continue Atorvastatin Calcium 40mg p.o.q.d. as directed; continue Ezetimibe 10mg p.o.q.d. as directed - advised low cholesterol/low fat diet - check FLP and LFTs\par history of hypertriglyceridemia - advised low cholesterol/low fat and low carbohydrate/low sugar diet - check FLP \par Endocrinology\par hyperglycemia - advised low carbohydrate/low sugar diet; continue CV exercise - check hemoglobin A1C\par obesity - advised low carbohydrate diet; continue CV exercise\par history of Vitamin D deficiency - continue Vitamin D 1000 IU BID p.o.q.d. with a meal as directed - check Vitamin D\par Endocrinology/Musculoskeletal\par osteoporosis - previously recommended starting Alendronate Sodium 70mg p.o.q.w. as directed; continue Vitamin D 1000 IU BID p.o.q.d. with a meal as directed; previously advised to start Calcium 500mg daily - continue to follow up with endocrinologist, Dr. Hughes\par Musculoskeletal\par right knee pain - secondary to arthritis - follow up with orthopedist, Dr. Alicea as necessary\par Gastroenterology\par elevated liver enzymes - check LFTs - possibly secondary to fatty liver; advised weight loss \par Previously referred to gastroenterologist, Dr. Perez for consultation prior to screening colonoscopy; IFOBT dated 10/31/22 was negative\par Gynecology\par Patient to see GYN in January 2023 for annual visit; results of pap smear to be requested\par Immunization\par Shingrix (2nd dose) - evidence of vaccine administration to be requested from patient's pharmacy \par Recommended following up at pharmacy in 3 months for updated COVID-19 vaccine booster\par Infectious Disease\par chest heaviness, SOB, ear popping, and fatigue - likely secondary to recent COVID-19 infection - advised to follow up if chest heaviness/SOB persist or worsen \par \par check CMP, FLP, hemoglobin A1C, and Vitamin D

## 2023-01-10 ENCOUNTER — TRANSCRIPTION ENCOUNTER (OUTPATIENT)
Age: 63
End: 2023-01-10

## 2023-01-10 LAB
25(OH)D3 SERPL-MCNC: 37.1 NG/ML
ALBUMIN SERPL ELPH-MCNC: 4.9 G/DL
ALP BLD-CCNC: 74 U/L
ALT SERPL-CCNC: 23 U/L
ANION GAP SERPL CALC-SCNC: 12 MMOL/L
AST SERPL-CCNC: 22 U/L
BILIRUB SERPL-MCNC: 0.8 MG/DL
BUN SERPL-MCNC: 16 MG/DL
CALCIUM SERPL-MCNC: 10 MG/DL
CHLORIDE SERPL-SCNC: 102 MMOL/L
CHOLEST SERPL-MCNC: 163 MG/DL
CO2 SERPL-SCNC: 28 MMOL/L
CREAT SERPL-MCNC: 1.01 MG/DL
EGFR: 64 ML/MIN/1.73M2
ESTIMATED AVERAGE GLUCOSE: 120 MG/DL
GLUCOSE SERPL-MCNC: 96 MG/DL
HBA1C MFR BLD HPLC: 5.8 %
HDLC SERPL-MCNC: 54 MG/DL
LDLC SERPL CALC-MCNC: 86 MG/DL
NONHDLC SERPL-MCNC: 110 MG/DL
POTASSIUM SERPL-SCNC: 4.6 MMOL/L
PROT SERPL-MCNC: 7.1 G/DL
SODIUM SERPL-SCNC: 142 MMOL/L
TRIGL SERPL-MCNC: 122 MG/DL

## 2023-03-31 ENCOUNTER — APPOINTMENT (OUTPATIENT)
Dept: INTERNAL MEDICINE | Facility: CLINIC | Age: 63
End: 2023-03-31
Payer: MEDICAID

## 2023-03-31 ENCOUNTER — NON-APPOINTMENT (OUTPATIENT)
Age: 63
End: 2023-03-31

## 2023-03-31 VITALS
DIASTOLIC BLOOD PRESSURE: 72 MMHG | HEIGHT: 69 IN | RESPIRATION RATE: 16 BRPM | SYSTOLIC BLOOD PRESSURE: 118 MMHG | HEART RATE: 74 BPM | BODY MASS INDEX: 34.23 KG/M2 | OXYGEN SATURATION: 98 % | TEMPERATURE: 97 F | WEIGHT: 231.13 LBS

## 2023-03-31 DIAGNOSIS — M81.0 AGE-RELATED OSTEOPOROSIS W/OUT CURRENT PATHOLOGICAL FRACTURE: ICD-10-CM

## 2023-03-31 DIAGNOSIS — Z11.59 ENCOUNTER FOR SCREENING FOR OTHER VIRAL DISEASES: ICD-10-CM

## 2023-03-31 PROCEDURE — 99396 PREV VISIT EST AGE 40-64: CPT | Mod: 25

## 2023-03-31 PROCEDURE — G0444 DEPRESSION SCREEN ANNUAL: CPT | Mod: 59

## 2023-03-31 PROCEDURE — 93000 ELECTROCARDIOGRAM COMPLETE: CPT | Mod: 59

## 2023-03-31 RX ORDER — ALENDRONATE SODIUM 70 MG/1
70 TABLET ORAL
Qty: 12 | Refills: 3 | Status: COMPLETED | COMMUNITY
Start: 2022-02-16 | End: 2023-03-31

## 2023-03-31 RX ORDER — SODIUM FLUORIDE 1.1 G/100G
1.1 GEL, DENTIFRICE ORAL
Qty: 51 | Refills: 0 | Status: DISCONTINUED | COMMUNITY
Start: 2021-02-02 | End: 2023-03-31

## 2023-03-31 RX ORDER — MELOXICAM 15 MG/1
15 TABLET ORAL
Qty: 30 | Refills: 2 | Status: DISCONTINUED | COMMUNITY
Start: 2022-06-30 | End: 2023-03-31

## 2023-04-01 PROBLEM — M81.0 OSTEOPOROSIS, POST-MENOPAUSAL: Status: ACTIVE | Noted: 2022-02-16

## 2023-04-01 PROBLEM — Z11.59 ENCOUNTER FOR HCV SCREENING TEST FOR LOW RISK PATIENT: Status: ACTIVE | Noted: 2023-03-31

## 2023-04-01 NOTE — END OF VISIT
[FreeTextEntry3] : This note was written by Chanelle Cobb on 03/31/2023, acting as a scribe for Dr. David Sauer DO.\par \par All medic record entries were at my, Dr. David Sauer DO , direction and personally dictated by me in 03/31/2023. I have personally reviewed the chart and agree that the record accurately reflects my personal performance of the history, physical exam, assessment, and plan.\par

## 2023-04-01 NOTE — PLAN
[FreeTextEntry1] : Cardiology\par hyperlipidemia - continue Atorvastatin Calcium 40mg p.o.q.d. as directed, Rx filled; continue Ezetimibe 10mg p.o.q.d. as directed, Rx filled - advised low cholesterol/low fat diet - check FLP and LFTs\par history of hypertriglyceridemia - advised low cholesterol/low fat and low carbohydrate/low sugar diet - check FLP \par Endocrinology\par hyperglycemia - advised low carbohydrate/low sugar diet; continue CV exercise - check hemoglobin A1C\par obesity - advised low carbohydrate diet; continue CV exercise\par history of Vitamin D deficiency - continue Vitamin D 1000 IU BID p.o.q.d. with a meal as directed - check Vitamin D\par Endocrinology/Musculoskeletal\par osteoporosis - previously recommended starting Alendronate Sodium 70mg p.o.q.w. as directed; discussed risks of untreated osteoporosis; discussed Prolia injections; continue Vitamin D 1000 IU BID p.o.q.d. with a meal as directed; previously advised to start Calcium 500mg daily - continue to follow up with endocrinologist, Dr. Hughes\par Musculoskeletal\par right knee pain - secondary to arthritis - follow up with orthopedist, Dr. Alicea as necessary\par Gastroenterology\par elevated liver enzymes - check LFTs - possibly secondary to fatty liver; advised weight loss \par Gynecology\par advised pt can seek opinions from other GYN if hysteroscopy is recommended; continue f/u with GYN for annual visits; will request consult from GYN  Are office\par Infectious Disease\par check Hepatitis C Ab \par Immunization\par Recommended following up at pharmacy for updated COVID-19 vaccine booster\par \par Fasting blood work done in office

## 2023-04-01 NOTE — HISTORY OF PRESENT ILLNESS
[FreeTextEntry1] : CPE [de-identified] : Patient is a 61 year old female with a past medical history as below who presents for CPE.\par \par Patient denies any new arthralgias or myalgias on Atorvastatin Calcium. She has been taking Ezetimibe (Zetia) daily as prescribed.\par \par Blood work done on 12/28/22 revealed normal lipids, CMP, Vitamin D, and elevated A1c (5.8).\par \par Patient had annual GYN visit with Dr. Robert two weeks ago. She was sent for a pelvic US, results found abnormally thickened endometrium w/ internal cystic spaces. Due to results of US patient was referred to surgeon for D&C hysteroscopy to rule out endometrial neoplasm. She is unsure if she wants hysteroscopy done but will go for consultation. Denies any recent vaginal bleeding, spotting, discharge or pelvic pain.\par \par Patient had colonoscopy done on 2/1/23. During the consultation with her gastroenterologist they recommended she have testing done for Hepatitis C. She declined the testing at the time because she was unsure if it was necessary and requests having it done today.\par \par Patient notes seeing endocrinologist for osteoporosis in lumbar spine. They offered her Alendronate but she declined the medication after reading side effects online.\par \par Patient has not received the bivalent COVID-19 vaccine booster. Patient notes history of COVID-19 infection (early December 2022). She had reported mild symptoms. Received flu vaccine at [facility] pharmacy in October 2022.

## 2023-04-01 NOTE — ASSESSMENT
[FreeTextEntry1] : Patient is a 61 year old female with a past medical history as above who presents for CPE

## 2023-04-01 NOTE — PHYSICAL EXAM
[No Acute Distress] : no acute distress [Well Nourished] : well nourished [Well Developed] : well developed [Well-Appearing] : well-appearing [Normal Sclera/Conjunctiva] : normal sclera/conjunctiva [PERRL] : pupils equal round and reactive to light [EOMI] : extraocular movements intact [Normal Outer Ear/Nose] : the outer ears and nose were normal in appearance [Normal Oropharynx] : the oropharynx was normal [No JVD] : no jugular venous distention [No Lymphadenopathy] : no lymphadenopathy [Supple] : supple [No Respiratory Distress] : no respiratory distress  [No Accessory Muscle Use] : no accessory muscle use [Clear to Auscultation] : lungs were clear to auscultation bilaterally [Normal Rate] : normal rate  [Regular Rhythm] : with a regular rhythm [Normal S1, S2] : normal S1 and S2 [No Murmur] : no murmur heard [No Carotid Bruits] : no carotid bruits [No Abdominal Bruit] : a ~M bruit was not heard ~T in the abdomen [No Varicosities] : no varicosities [No Edema] : there was no peripheral edema [No Palpable Aorta] : no palpable aorta [Soft] : abdomen soft [Non Tender] : non-tender [Non-distended] : non-distended [No Masses] : no abdominal mass palpated [No HSM] : no HSM [Normal Bowel Sounds] : normal bowel sounds [Normal Posterior Cervical Nodes] : no posterior cervical lymphadenopathy [Normal Anterior Cervical Nodes] : no anterior cervical lymphadenopathy [No CVA Tenderness] : no CVA  tenderness [No Spinal Tenderness] : no spinal tenderness [No Joint Swelling] : no joint swelling [Grossly Normal Strength/Tone] : grossly normal strength/tone [No Rash] : no rash [Coordination Grossly Intact] : coordination grossly intact [No Focal Deficits] : no focal deficits [Normal Gait] : normal gait [Deep Tendon Reflexes (DTR)] : deep tendon reflexes were 2+ and symmetric [Normal Affect] : the affect was normal [Normal Insight/Judgement] : insight and judgment were intact [Normal Voice/Communication] : normal voice/communication [Normal TMs] : both tympanic membranes were normal [Normal Nasal Mucosa] : the nasal mucosa was normal [Declined Rectal Exam] : declined rectal exam [Normal Supraclavicular Nodes] : no supraclavicular lymphadenopathy [Speech Grossly Normal] : speech grossly normal [Memory Grossly Normal] : memory grossly normal [Alert and Oriented x3] : oriented to person, place, and time [Normal Mood] : the mood was normal [FreeTextEntry1] : done by gastroenterology

## 2023-04-01 NOTE — HEALTH RISK ASSESSMENT
[Good] : ~his/her~  mood as  good [Patient reported mammogram was normal] : Patient reported mammogram was normal [Patient reported PAP Smear was normal] : Patient reported PAP Smear was normal [Patient reported bone density results were abnormal] : Patient reported bone density results were abnormal [Patient reported colonoscopy was abnormal] : Patient reported colonoscopy was abnormal [2 - 4 times a month (2 pts)] : 2-4 times a month (2 points) [1 or 2 (0 pts)] : 1 or 2 (0 points) [Never (0 pts)] : Never (0 points) [No] : In the past 12 months have you used drugs other than those required for medical reasons? No [PHQ-2 Negative - No further assessment needed] : PHQ-2 Negative - No further assessment needed [PHQ-9 Negative - No further assessment needed] : PHQ-9 Negative - No further assessment needed [Never] : Never [Yes] : Yes [No falls in past year] : Patient reported no falls in the past year [Audit-CScore] : 2 [[id number]] : 0 [MammogramDate] : 12/22 [MammogramComments] : No mammographic or ultrasonographic evidence of malignancy; BI-RADS Category 1: Negative.  [PapSmearDate] : 01/21 [BoneDensityDate] : 02/22 [BoneDensityComments] : osteoporosis [ColonoscopyDate] : 02/23 [ColonoscopyComments] : hemorrhoids; diverticulosis in sigmoid colon

## 2023-04-20 ENCOUNTER — TRANSCRIPTION ENCOUNTER (OUTPATIENT)
Age: 63
End: 2023-04-20

## 2023-04-20 LAB
25(OH)D3 SERPL-MCNC: 41.2 NG/ML
ALBUMIN SERPL ELPH-MCNC: 4.9 G/DL
ALP BLD-CCNC: 71 U/L
ALT SERPL-CCNC: 28 U/L
ANION GAP SERPL CALC-SCNC: 14 MMOL/L
APPEARANCE: CLEAR
AST SERPL-CCNC: 29 U/L
BASOPHILS # BLD AUTO: 0.04 K/UL
BASOPHILS NFR BLD AUTO: 0.6 %
BILIRUB SERPL-MCNC: 1 MG/DL
BILIRUBIN URINE: NEGATIVE
BLOOD URINE: NEGATIVE
BUN SERPL-MCNC: 23 MG/DL
CALCIUM SERPL-MCNC: 9.5 MG/DL
CHLORIDE SERPL-SCNC: 105 MMOL/L
CHOLEST SERPL-MCNC: 135 MG/DL
CO2 SERPL-SCNC: 25 MMOL/L
COLOR: YELLOW
CREAT SERPL-MCNC: 1.05 MG/DL
EGFR: 60 ML/MIN/1.73M2
EOSINOPHIL # BLD AUTO: 0.12 K/UL
EOSINOPHIL NFR BLD AUTO: 1.9 %
ESTIMATED AVERAGE GLUCOSE: 120 MG/DL
GLUCOSE QUALITATIVE U: NEGATIVE
GLUCOSE SERPL-MCNC: 97 MG/DL
HBA1C MFR BLD HPLC: 5.8 %
HCT VFR BLD CALC: 45.7 %
HCV AB SER QL: NONREACTIVE
HCV S/CO RATIO: 0.11 S/CO
HDLC SERPL-MCNC: 48 MG/DL
HGB BLD-MCNC: 14 G/DL
IMM GRANULOCYTES NFR BLD AUTO: 0.3 %
KETONES URINE: NEGATIVE
LDLC SERPL CALC-MCNC: 70 MG/DL
LEUKOCYTE ESTERASE URINE: NEGATIVE
LYMPHOCYTES # BLD AUTO: 1.96 K/UL
LYMPHOCYTES NFR BLD AUTO: 30.8 %
MAN DIFF?: NORMAL
MCHC RBC-ENTMCNC: 30.6 GM/DL
MCHC RBC-ENTMCNC: 30.7 PG
MCV RBC AUTO: 100.2 FL
MONOCYTES # BLD AUTO: 0.45 K/UL
MONOCYTES NFR BLD AUTO: 7.1 %
NEUTROPHILS # BLD AUTO: 3.77 K/UL
NEUTROPHILS NFR BLD AUTO: 59.3 %
NITRITE URINE: NEGATIVE
NONHDLC SERPL-MCNC: 87 MG/DL
PH URINE: 6
PLATELET # BLD AUTO: 267 K/UL
POTASSIUM SERPL-SCNC: 4.4 MMOL/L
PROT SERPL-MCNC: 6.9 G/DL
PROTEIN URINE: NORMAL
RBC # BLD: 4.56 M/UL
RBC # FLD: 13.3 %
SODIUM SERPL-SCNC: 144 MMOL/L
SPECIFIC GRAVITY URINE: 1.03
TRIGL SERPL-MCNC: 88 MG/DL
TSH SERPL-ACNC: 3.36 UIU/ML
UROBILINOGEN URINE: NORMAL
WBC # FLD AUTO: 6.36 K/UL

## 2023-05-11 ENCOUNTER — TRANSCRIPTION ENCOUNTER (OUTPATIENT)
Age: 63
End: 2023-05-11

## 2023-05-12 ENCOUNTER — NON-APPOINTMENT (OUTPATIENT)
Age: 63
End: 2023-05-12

## 2023-05-15 ENCOUNTER — APPOINTMENT (OUTPATIENT)
Dept: INTERNAL MEDICINE | Facility: CLINIC | Age: 63
End: 2023-05-15
Payer: MEDICAID

## 2023-05-15 ENCOUNTER — NON-APPOINTMENT (OUTPATIENT)
Age: 63
End: 2023-05-15

## 2023-05-15 VITALS
TEMPERATURE: 97.2 F | RESPIRATION RATE: 16 BRPM | SYSTOLIC BLOOD PRESSURE: 124 MMHG | BODY MASS INDEX: 34.8 KG/M2 | DIASTOLIC BLOOD PRESSURE: 68 MMHG | OXYGEN SATURATION: 97 % | HEART RATE: 79 BPM | HEIGHT: 69 IN | WEIGHT: 235 LBS

## 2023-05-15 DIAGNOSIS — Z01.818 ENCOUNTER FOR OTHER PREPROCEDURAL EXAMINATION: ICD-10-CM

## 2023-05-15 DIAGNOSIS — R93.89 ABNORMAL FINDINGS ON DIAGNOSTIC IMAGING OF OTHER SPECIFIED BODY STRUCTURES: ICD-10-CM

## 2023-05-15 PROCEDURE — 93000 ELECTROCARDIOGRAM COMPLETE: CPT | Mod: 59

## 2023-05-15 PROCEDURE — 99214 OFFICE O/P EST MOD 30 MIN: CPT | Mod: 25

## 2023-05-16 PROBLEM — R93.89 THICKENED ENDOMETRIUM: Status: ACTIVE | Noted: 2023-05-16

## 2023-05-16 PROBLEM — Z01.818 PRE-OPERATIVE CLEARANCE: Status: ACTIVE | Noted: 2023-05-15

## 2023-05-16 RX ORDER — COVID-19 MOLECULAR TEST ASSAY
KIT MISCELLANEOUS
Qty: 2 | Refills: 0 | Status: DISCONTINUED | COMMUNITY
Start: 2023-04-23

## 2023-05-17 LAB
ABO + RH PNL BLD: NORMAL
ALBUMIN SERPL ELPH-MCNC: 4.9 G/DL
ALP BLD-CCNC: 66 U/L
ALT SERPL-CCNC: 27 U/L
ANION GAP SERPL CALC-SCNC: 12 MMOL/L
AST SERPL-CCNC: 26 U/L
BASOPHILS # BLD AUTO: 0.05 K/UL
BASOPHILS NFR BLD AUTO: 0.6 %
BILIRUB SERPL-MCNC: 1.2 MG/DL
BUN SERPL-MCNC: 17 MG/DL
CALCIUM SERPL-MCNC: 10.2 MG/DL
CHLORIDE SERPL-SCNC: 105 MMOL/L
CO2 SERPL-SCNC: 26 MMOL/L
CREAT SERPL-MCNC: 0.93 MG/DL
EGFR: 70 ML/MIN/1.73M2
EOSINOPHIL # BLD AUTO: 0.18 K/UL
EOSINOPHIL NFR BLD AUTO: 2.3 %
GLUCOSE SERPL-MCNC: 92 MG/DL
HCT VFR BLD CALC: 44 %
HGB BLD-MCNC: 13.9 G/DL
IMM GRANULOCYTES NFR BLD AUTO: 0.1 %
LYMPHOCYTES # BLD AUTO: 2.57 K/UL
LYMPHOCYTES NFR BLD AUTO: 33.3 %
MAN DIFF?: NORMAL
MCHC RBC-ENTMCNC: 31.4 PG
MCHC RBC-ENTMCNC: 31.6 GM/DL
MCV RBC AUTO: 99.5 FL
MONOCYTES # BLD AUTO: 0.46 K/UL
MONOCYTES NFR BLD AUTO: 6 %
NEUTROPHILS # BLD AUTO: 4.44 K/UL
NEUTROPHILS NFR BLD AUTO: 57.7 %
PLATELET # BLD AUTO: 251 K/UL
POTASSIUM SERPL-SCNC: 4.1 MMOL/L
PROT SERPL-MCNC: 7.1 G/DL
RBC # BLD: 4.42 M/UL
RBC # FLD: 12.8 %
SODIUM SERPL-SCNC: 143 MMOL/L
WBC # FLD AUTO: 7.71 K/UL

## 2023-05-17 NOTE — HISTORY OF PRESENT ILLNESS
[No Pertinent Cardiac History] : no history of aortic stenosis, atrial fibrillation, coronary artery disease, recent myocardial infarction, or implantable device/pacemaker [No Pertinent Pulmonary History] : no history of asthma, COPD, sleep apnea, or smoking [No Adverse Anesthesia Reaction] : no adverse anesthesia reaction in self or family member [(Patient denies any chest pain, claudication, dyspnea on exertion, orthopnea, palpitations or syncope)] : Patient denies any chest pain, claudication, dyspnea on exertion, orthopnea, palpitations or syncope [Moderate (4-6 METs)] : Moderate (4-6 METs) [Chronic Anticoagulation] : no chronic anticoagulation [Chronic Kidney Disease] : no chronic kidney disease [Diabetes] : no diabetes [FreeTextEntry1] : D&C/ Hysteroscopy  [FreeTextEntry2] : 5/23/23 [FreeTextEntry3] : Dr. Patterson [FreeTextEntry4] : Patient is a 62 year-old female with a past medical history as below who presents for preoperative evaluation prior to D&C/hysteroscopy procedure. [facility] [place]. The patient has no history of allergy or adverse reaction to anesthesia. No history of excessive bleeding or bruising. The patient can walk many blocks and can walk up 1-2 flights of stairs without dyspnea on exertion. Denies chest pain or palpitations. \par \par Patient notes for past week having sharp pain like needles and numbness on lateral side of left upper leg. Denies pain radiating down leg or back pain. She is currently taking Advil and Aleve for pain relief.

## 2023-05-17 NOTE — ASSESSMENT
[Patient Optimized for Surgery] : Patient optimized for surgery [No Further Testing Recommended] : no further testing recommended [FreeTextEntry4] : The patient is a 62 year old female who is a low risk surgical patient with adequate functional capacity going for an intermediate risk surgical procedure.

## 2023-05-17 NOTE — PHYSICAL EXAM
[No Acute Distress] : no acute distress [Well Nourished] : well nourished [Well Developed] : well developed [Well-Appearing] : well-appearing [Normal Sclera/Conjunctiva] : normal sclera/conjunctiva [PERRL] : pupils equal round and reactive to light [EOMI] : extraocular movements intact [Normal Outer Ear/Nose] : the outer ears and nose were normal in appearance [Normal Oropharynx] : the oropharynx was normal [No JVD] : no jugular venous distention [No Lymphadenopathy] : no lymphadenopathy [Supple] : supple [Thyroid Normal, No Nodules] : the thyroid was normal and there were no nodules present [No Respiratory Distress] : no respiratory distress  [No Accessory Muscle Use] : no accessory muscle use [Clear to Auscultation] : lungs were clear to auscultation bilaterally [Normal Rate] : normal rate  [Regular Rhythm] : with a regular rhythm [Normal S1, S2] : normal S1 and S2 [No Murmur] : no murmur heard [No Carotid Bruits] : no carotid bruits [No Abdominal Bruit] : a ~M bruit was not heard ~T in the abdomen [No Varicosities] : no varicosities [Pedal Pulses Present] : the pedal pulses are present [No Edema] : there was no peripheral edema [No Palpable Aorta] : no palpable aorta [Soft] : abdomen soft [Non Tender] : non-tender [Non-distended] : non-distended [No Masses] : no abdominal mass palpated [No HSM] : no HSM [Normal Bowel Sounds] : normal bowel sounds [Normal Posterior Cervical Nodes] : no posterior cervical lymphadenopathy [Normal Anterior Cervical Nodes] : no anterior cervical lymphadenopathy [No CVA Tenderness] : no CVA  tenderness [No Spinal Tenderness] : no spinal tenderness [No Joint Swelling] : no joint swelling [Grossly Normal Strength/Tone] : grossly normal strength/tone [No Rash] : no rash [Coordination Grossly Intact] : coordination grossly intact [No Focal Deficits] : no focal deficits [Normal Gait] : normal gait [Deep Tendon Reflexes (DTR)] : deep tendon reflexes were 2+ and symmetric [Normal Affect] : the affect was normal [Normal Insight/Judgement] : insight and judgment were intact [Normal Voice/Communication] : normal voice/communication [Normal TMs] : both tympanic membranes were normal [Normal Nasal Mucosa] : the nasal mucosa was normal [Normal Supraclavicular Nodes] : no supraclavicular lymphadenopathy [Speech Grossly Normal] : speech grossly normal [Memory Grossly Normal] : memory grossly normal [Alert and Oriented x3] : oriented to person, place, and time [Normal Mood] : the mood was normal [Kyphosis] : no kyphosis [Scoliosis] : no scoliosis [Acne] : no acne

## 2023-05-17 NOTE — PLAN
[FreeTextEntry1] : 1. Preoperative EKG performed - results are noted above. \par 2. The patient was instructed to stop eating prior to midnight the evening before the procedure.\par 3. The patient was advised to stop medications 1 day prior to the procedure. No NSAIDs or vitamins one week prior to procedure. No Atorvastatin Calcium 40 MG morning of surgery.\par 4. There is no medical contraindication to the planned procedure and the patient is therefore medically optimized/cleared for the procedure pending results of COVID-19 PCR (Nasopharyngeal Swab) if necessary. \par \par Blood work done in office today-reviewed and acceptable\par Refer to neurologists at  [facility]rological [facility] for workup of left thigh paresthesia

## 2023-09-13 ENCOUNTER — APPOINTMENT (OUTPATIENT)
Dept: INTERNAL MEDICINE | Facility: CLINIC | Age: 63
End: 2023-09-13
Payer: MEDICAID

## 2023-09-13 ENCOUNTER — LABORATORY RESULT (OUTPATIENT)
Age: 63
End: 2023-09-13

## 2023-09-13 VITALS
WEIGHT: 233 LBS | RESPIRATION RATE: 17 BRPM | HEIGHT: 69 IN | SYSTOLIC BLOOD PRESSURE: 112 MMHG | HEART RATE: 84 BPM | OXYGEN SATURATION: 97 % | BODY MASS INDEX: 34.51 KG/M2 | TEMPERATURE: 98.4 F | DIASTOLIC BLOOD PRESSURE: 78 MMHG

## 2023-09-13 DIAGNOSIS — E78.1 PURE HYPERGLYCERIDEMIA: ICD-10-CM

## 2023-09-13 DIAGNOSIS — E03.8 OTHER SPECIFIED HYPOTHYROIDISM: ICD-10-CM

## 2023-09-13 DIAGNOSIS — E66.9 OBESITY, UNSPECIFIED: ICD-10-CM

## 2023-09-13 PROCEDURE — 99214 OFFICE O/P EST MOD 30 MIN: CPT | Mod: 25

## 2023-09-14 LAB
25(OH)D3 SERPL-MCNC: 36.5 NG/ML
ALBUMIN SERPL ELPH-MCNC: 4.9 G/DL
ALP BLD-CCNC: 72 U/L
ALT SERPL-CCNC: 33 U/L
ANION GAP SERPL CALC-SCNC: 11 MMOL/L
AST SERPL-CCNC: 30 U/L
BILIRUB SERPL-MCNC: 1.5 MG/DL
BUN SERPL-MCNC: 14 MG/DL
CALCIUM SERPL-MCNC: 10.2 MG/DL
CHLORIDE SERPL-SCNC: 103 MMOL/L
CHOLEST SERPL-MCNC: 148 MG/DL
CO2 SERPL-SCNC: 27 MMOL/L
CREAT SERPL-MCNC: 1.01 MG/DL
EGFR: 63 ML/MIN/1.73M2
ESTIMATED AVERAGE GLUCOSE: 120 MG/DL
GLUCOSE SERPL-MCNC: 109 MG/DL
HBA1C MFR BLD HPLC: 5.8 %
HDLC SERPL-MCNC: 51 MG/DL
LDLC SERPL CALC-MCNC: 76 MG/DL
NONHDLC SERPL-MCNC: 97 MG/DL
POTASSIUM SERPL-SCNC: 4.3 MMOL/L
PROT SERPL-MCNC: 7.2 G/DL
SODIUM SERPL-SCNC: 142 MMOL/L
T3FREE SERPL-MCNC: 4.69 PG/ML
T4 FREE SERPL-MCNC: 1.6 NG/DL
TRIGL SERPL-MCNC: 118 MG/DL
TSH SERPL-ACNC: 3.89 UIU/ML

## 2023-12-11 ENCOUNTER — APPOINTMENT (OUTPATIENT)
Dept: INTERNAL MEDICINE | Facility: CLINIC | Age: 63
End: 2023-12-11
Payer: MEDICAID

## 2023-12-11 VITALS
HEIGHT: 69 IN | SYSTOLIC BLOOD PRESSURE: 136 MMHG | TEMPERATURE: 96.4 F | OXYGEN SATURATION: 98 % | RESPIRATION RATE: 16 BRPM | BODY MASS INDEX: 34.86 KG/M2 | HEART RATE: 95 BPM | WEIGHT: 235.38 LBS | DIASTOLIC BLOOD PRESSURE: 72 MMHG

## 2023-12-11 DIAGNOSIS — H66.001 ACUTE SUPPURATIVE OTITIS MEDIA W/OUT SPONTANEOUS RUPTURE OF EAR DRUM, RIGHT EAR: ICD-10-CM

## 2023-12-11 DIAGNOSIS — R05.9 COUGH, UNSPECIFIED: ICD-10-CM

## 2023-12-11 DIAGNOSIS — J00 ACUTE NASOPHARYNGITIS [COMMON COLD]: ICD-10-CM

## 2023-12-11 PROCEDURE — 99214 OFFICE O/P EST MOD 30 MIN: CPT

## 2023-12-11 RX ORDER — CLARITHROMYCIN 500 MG/1
500 TABLET, FILM COATED ORAL
Qty: 20 | Refills: 0 | Status: COMPLETED | COMMUNITY
Start: 2023-12-11 | End: 2023-12-21

## 2023-12-11 RX ORDER — PROMETHAZINE HYDROCHLORIDE AND DEXTROMETHORPHAN HYDROBROMIDE ORAL SOLUTION 15; 6.25 MG/5ML; MG/5ML
6.25-15 SOLUTION ORAL EVERY 6 HOURS
Qty: 200 | Refills: 0 | Status: COMPLETED | COMMUNITY
Start: 2023-12-11 | End: 2023-12-21

## 2023-12-13 ENCOUNTER — TRANSCRIPTION ENCOUNTER (OUTPATIENT)
Age: 63
End: 2023-12-13

## 2023-12-13 LAB
INFLUENZA A RESULT: NOT DETECTED
INFLUENZA B RESULT: NOT DETECTED
RESP SYN VIRUS RESULT: NOT DETECTED
SARS-COV-2 RESULT: NOT DETECTED

## 2023-12-14 LAB — BACTERIA THROAT CULT: NORMAL

## 2023-12-15 ENCOUNTER — NON-APPOINTMENT (OUTPATIENT)
Age: 63
End: 2023-12-15

## 2024-01-18 ENCOUNTER — APPOINTMENT (OUTPATIENT)
Dept: ORTHOPEDIC SURGERY | Facility: CLINIC | Age: 64
End: 2024-01-18
Payer: MEDICAID

## 2024-01-18 VITALS — BODY MASS INDEX: 34.8 KG/M2 | WEIGHT: 235 LBS | HEIGHT: 69 IN

## 2024-01-18 DIAGNOSIS — M51.36 OTHER INTERVERTEBRAL DISC DEGENERATION, LUMBAR REGION: ICD-10-CM

## 2024-01-18 DIAGNOSIS — M43.16 SPONDYLOLISTHESIS, LUMBAR REGION: ICD-10-CM

## 2024-01-18 DIAGNOSIS — G57.12 MERALGIA PARESTHETICA, LEFT LOWER LIMB: ICD-10-CM

## 2024-01-18 DIAGNOSIS — M54.16 RADICULOPATHY, LUMBAR REGION: ICD-10-CM

## 2024-01-18 PROCEDURE — 99214 OFFICE O/P EST MOD 30 MIN: CPT

## 2024-01-18 PROCEDURE — 72170 X-RAY EXAM OF PELVIS: CPT

## 2024-01-18 PROCEDURE — 72100 X-RAY EXAM L-S SPINE 2/3 VWS: CPT

## 2024-01-18 NOTE — HISTORY OF PRESENT ILLNESS
[3] : 3 [Dull/Aching] : dull/aching [Intermittent] : intermittent [Nothing helps with pain getting better] : Nothing helps with pain getting better [de-identified] :  01/18/2024: 6 moths of tingling and pain over the anterior left thigh. denies specific back or groin pain. symptoms do not seem to have a pattern sometimes wakes her up sometimes can be with activities. she saw her pcp and he advised ortho consult [] : no [FreeTextEntry1] : LT Thigh [FreeTextEntry5] : Pt has had pain, N/T in her LT thigh for 6 months, denies injury.

## 2024-01-18 NOTE — PHYSICAL EXAM
[Flexion] : flexion [Extension] : extension [Bending to left] : bending to left [Bending to right] : bending to right [de-identified] : diminished sensation to light touch anterior left thigh compared to the right  [Left] : left hip [] : full ROM without pain  [FreeTextEntry8] : numbness lateral femoral cutaneous nerve distrib lateral thigh

## 2024-01-18 NOTE — ASSESSMENT
[FreeTextEntry1] : Seems more consistent with meralgia paresthetica even though has ddd L5/S1.  MDP, PT prescribed Nerve may never return but can take 1 1/2 years before knowing whether comes back or not

## 2024-03-12 ENCOUNTER — RX RENEWAL (OUTPATIENT)
Age: 64
End: 2024-03-12

## 2024-03-31 ENCOUNTER — RESULT CHARGE (OUTPATIENT)
Age: 64
End: 2024-03-31

## 2024-04-02 ENCOUNTER — NON-APPOINTMENT (OUTPATIENT)
Age: 64
End: 2024-04-02

## 2024-04-02 ENCOUNTER — APPOINTMENT (OUTPATIENT)
Dept: INTERNAL MEDICINE | Facility: CLINIC | Age: 64
End: 2024-04-02
Payer: MEDICAID

## 2024-04-02 VITALS
OXYGEN SATURATION: 97 % | DIASTOLIC BLOOD PRESSURE: 78 MMHG | HEIGHT: 69 IN | TEMPERATURE: 98.1 F | RESPIRATION RATE: 16 BRPM | WEIGHT: 238.13 LBS | BODY MASS INDEX: 35.27 KG/M2 | SYSTOLIC BLOOD PRESSURE: 130 MMHG | HEART RATE: 78 BPM

## 2024-04-02 DIAGNOSIS — E55.9 VITAMIN D DEFICIENCY, UNSPECIFIED: ICD-10-CM

## 2024-04-02 DIAGNOSIS — E78.5 HYPERLIPIDEMIA, UNSPECIFIED: ICD-10-CM

## 2024-04-02 DIAGNOSIS — M81.0 AGE-RELATED OSTEOPOROSIS W/OUT CURRENT PATHOLOGICAL FRACTURE: ICD-10-CM

## 2024-04-02 DIAGNOSIS — R73.9 HYPERGLYCEMIA, UNSPECIFIED: ICD-10-CM

## 2024-04-02 DIAGNOSIS — Z79.899 OTHER LONG TERM (CURRENT) DRUG THERAPY: ICD-10-CM

## 2024-04-02 DIAGNOSIS — Z13.31 ENCOUNTER FOR SCREENING FOR DEPRESSION: ICD-10-CM

## 2024-04-02 DIAGNOSIS — Z00.00 ENCOUNTER FOR GENERAL ADULT MEDICAL EXAMINATION W/OUT ABNORMAL FINDINGS: ICD-10-CM

## 2024-04-02 DIAGNOSIS — I87.2 VENOUS INSUFFICIENCY (CHRONIC) (PERIPHERAL): ICD-10-CM

## 2024-04-02 PROCEDURE — 99213 OFFICE O/P EST LOW 20 MIN: CPT | Mod: 25

## 2024-04-02 PROCEDURE — G0444 DEPRESSION SCREEN ANNUAL: CPT | Mod: 59

## 2024-04-02 PROCEDURE — G2211 COMPLEX E/M VISIT ADD ON: CPT | Mod: NC,1L

## 2024-04-02 PROCEDURE — 99396 PREV VISIT EST AGE 40-64: CPT

## 2024-04-02 PROCEDURE — 93000 ELECTROCARDIOGRAM COMPLETE: CPT | Mod: 59

## 2024-04-02 RX ORDER — ATORVASTATIN CALCIUM 40 MG/1
40 TABLET, FILM COATED ORAL
Qty: 90 | Refills: 1 | Status: ACTIVE | COMMUNITY
Start: 2020-02-06 | End: 1900-01-01

## 2024-04-02 RX ORDER — ELECTROLYTES/DEXTROSE
SOLUTION, ORAL ORAL
Qty: 90 | Refills: 0 | Status: ACTIVE | COMMUNITY
Start: 2024-04-02

## 2024-04-02 RX ORDER — EZETIMIBE 10 MG/1
10 TABLET ORAL
Qty: 90 | Refills: 1 | Status: ACTIVE | COMMUNITY
Start: 2022-09-20 | End: 1900-01-01

## 2024-04-02 RX ORDER — MULTIVIT-MIN/FOLIC/VIT K/LYCOP 400-300MCG
1000 TABLET ORAL DAILY
Qty: 90 | Refills: 0 | Status: ACTIVE | COMMUNITY
Start: 2024-04-02

## 2024-04-02 NOTE — ASSESSMENT
[FreeTextEntry1] : Patient is a 63-year-old female with a past medical history as above who presents for an annual wellness visit.

## 2024-04-02 NOTE — HISTORY OF PRESENT ILLNESS
[FreeTextEntry1] : Annual wellness visit  [de-identified] : Patient is a 63-year-old female with a past medical history as below who presents for an annual wellness visit. Patient denies any new arthralgias or myalgias on Atorvastatin Calcium. She has been taking Ezetimibe (Zetia) daily as prescribed. Patient has seen ophthalmologist, Dr. Key, at [facility] for her annual eye exam. She wears prescription glasses. Patient saw GYN, Dr. Travis, two months ago for her annual GYN visit. Last breast imaging (Mammogram/Breast US) was done in December of 2023. Last bone density exam was done on 2/16/2022. Patient's last colonoscopy was done in February of 2023 with gastroenterologist, Dr. Hu. Patient is vaccinated against COVID-19 (x5). She notes a h/o COVID infection. Patient has received the flu vaccine for this season in October of 2023. Patient received the Tetanus vaccine on 2/28/2017. Patient has received both dosages of the Shingles (Shingrix) vaccine in 2021. Patient has not been maintaining a balanced diet or regular exercise. She states that prior to her children's weddings (approx. 3 months ago) she was walking on the treadmill x3/week and was maintaining a healthy diet overall.   Last labs in September of 2023 demonstrated elevated hemoglobin A1C level at 5.8%, elevated free T3 levels at 4.69 pg/mL, elevated glucose levels at 109 mg/dL, and elevated total bilirubin levels at 1.5 mg/dL.    Patient notes lower-extremity swelling, right greater than left.

## 2024-04-02 NOTE — PHYSICAL EXAM
[No Acute Distress] : no acute distress [Well Nourished] : well nourished [Well Developed] : well developed [Well-Appearing] : well-appearing [Normal Sclera/Conjunctiva] : normal sclera/conjunctiva [PERRL] : pupils equal round and reactive to light [EOMI] : extraocular movements intact [Normal Outer Ear/Nose] : the outer ears and nose were normal in appearance [Normal Oropharynx] : the oropharynx was normal [No JVD] : no jugular venous distention [No Lymphadenopathy] : no lymphadenopathy [Supple] : supple [Thyroid Normal, No Nodules] : the thyroid was normal and there were no nodules present [No Respiratory Distress] : no respiratory distress  [No Accessory Muscle Use] : no accessory muscle use [Clear to Auscultation] : lungs were clear to auscultation bilaterally [Normal Rate] : normal rate  [Regular Rhythm] : with a regular rhythm [Normal S1, S2] : normal S1 and S2 [No Murmur] : no murmur heard [No Abdominal Bruit] : a ~M bruit was not heard ~T in the abdomen [No Carotid Bruits] : no carotid bruits [Pedal Pulses Present] : the pedal pulses are present [No Palpable Aorta] : no palpable aorta [No Extremity Clubbing/Cyanosis] : no extremity clubbing/cyanosis [Soft] : abdomen soft [Non Tender] : non-tender [Non-distended] : non-distended [No Masses] : no abdominal mass palpated [No HSM] : no HSM [Normal Bowel Sounds] : normal bowel sounds [Normal Posterior Cervical Nodes] : no posterior cervical lymphadenopathy [Normal Anterior Cervical Nodes] : no anterior cervical lymphadenopathy [No Spinal Tenderness] : no spinal tenderness [No CVA Tenderness] : no CVA  tenderness [No Joint Swelling] : no joint swelling [Grossly Normal Strength/Tone] : grossly normal strength/tone [No Rash] : no rash [Coordination Grossly Intact] : coordination grossly intact [Normal Gait] : normal gait [No Focal Deficits] : no focal deficits [Deep Tendon Reflexes (DTR)] : deep tendon reflexes were 2+ and symmetric [Normal Affect] : the affect was normal [Normal Insight/Judgement] : insight and judgment were intact [Normal] : affect was normal and insight and judgment were intact [Normal Voice/Communication] : normal voice/communication [Fundoscopic Exam Performed] : fundoscopic ~T exam ~C was performed [Normal TMs] : both tympanic membranes were normal [Normal Nasal Mucosa] : the nasal mucosa was normal [Normal Supraclavicular Nodes] : no supraclavicular lymphadenopathy [Scoliosis] : no scoliosis [Kyphosis] : no kyphosis [Acne] : no acne [Memory Grossly Normal] : memory grossly normal [Speech Grossly Normal] : speech grossly normal [Alert and Oriented x3] : oriented to person, place, and time [Normal Mood] : the mood was normal [de-identified] : trace pitting edema bilerally, spider veins, mild varicose veins  [de-identified] : done by GYN (Dr. dominguez) [de-identified] : obese [FreeTextEntry1] : done by [facility] (Dr. Hu)

## 2024-04-02 NOTE — ADDENDUM
[FreeTextEntry1] : I, Brittdemetris Stoddardangela, acted solely as scribe for Dr. David Sauer DO on this date 4/2/2024.  All medical record entries made by the Scribe were at my, Dr. David Sauer DO direction and personally dictated by me on 4/2/2024. I have reviewed the chart and agree that the record accurately reflects my personal performance of the history, physical exam, assessment and plan. I have also personally directed, reviewed and agreed with the chart.

## 2024-04-02 NOTE — HEALTH RISK ASSESSMENT
[Yes] : Yes [Monthly or less (1 pt)] : Monthly or less (1 point) [1 or 2 (0 pts)] : 1 or 2 (0 points) [Never (0 pts)] : Never (0 points) [No] : In the past 12 months have you used drugs other than those required for medical reasons? No [0] : 2) Feeling down, depressed, or hopeless: Not at all (0) [PHQ-2 Negative - No further assessment needed] : PHQ-2 Negative - No further assessment needed [Never] : Never [Audit-CScore] : 1 [[id number]] : 0

## 2024-04-02 NOTE — PLAN
[FreeTextEntry1] : Ophthalmology results of most recent eye exam to be requested from [facility] - continue to follow up with ophthalmologist Dr. Key annually  Cardiology hyperlipidemia - continue Atorvastatin Calcium 40mg p.o.q.d. as directed, Rx filled; continue Ezetimibe 10mg p.o.q.d. as directed, Rx filled - advised low cholesterol/low fat diet - check FLP and LFTs history of hypertriglyceridemia - advised low cholesterol/low fat and low carbohydrate/low sugar diet - check FLP Endocrinology hyperglycemia - advised low carbohydrate/low sugar diet; continue CV exercise - check hemoglobin A1C obesity - advised low carbohydrate diet; continue CV exercise history of Vitamin D deficiency - continue Vitamin D 1000 IU BID p.o.q.d. with a meal as directed, Rx filled - check Vitamin D osteoporosis - previously recommended starting Alendronate Sodium 70mg p.o.q.w. as directed; discussed risks of untreated osteoporosis; discussed Prolia injections; continue Vitamin D 1000 IU BID p.o.q.d. with a meal as directed; previously advised to start Calcium 500mg daily - continue to follow up with endocrinologist, Dr. Hughes-patient currently refusing pharmacotherapy secondary to potential adverse side effects of medications, RX DEXA given to patient Gynecology continue f/u with GYN for annual visits; will request consult from GYN Dr. Travis's office with results of pap and breast imaging Vascular  trace pitting edema bilaterally - secondary to mild venous insufficiency - recommended to elevate the legs when possible; advised to follow a low sodium diet; continue weight loss   check EKG (as above), hemoglobin A1C, CBC, CMP, FLP, TSH, and Vitamin D

## 2024-04-03 LAB
25(OH)D3 SERPL-MCNC: 31.8 NG/ML
ALBUMIN SERPL ELPH-MCNC: 4.7 G/DL
ALP BLD-CCNC: 82 U/L
ALT SERPL-CCNC: 21 U/L
ANION GAP SERPL CALC-SCNC: 11 MMOL/L
AST SERPL-CCNC: 19 U/L
BILIRUB SERPL-MCNC: 0.8 MG/DL
BUN SERPL-MCNC: 17 MG/DL
CALCIUM SERPL-MCNC: 9.3 MG/DL
CHLORIDE SERPL-SCNC: 104 MMOL/L
CO2 SERPL-SCNC: 27 MMOL/L
CREAT SERPL-MCNC: 1.01 MG/DL
EGFR: 63 ML/MIN/1.73M2
GLUCOSE SERPL-MCNC: 107 MG/DL
POTASSIUM SERPL-SCNC: 4 MMOL/L
PROT SERPL-MCNC: 7 G/DL
SODIUM SERPL-SCNC: 141 MMOL/L

## 2024-04-07 LAB
BASOPHILS # BLD AUTO: 0.03 K/UL
BASOPHILS NFR BLD AUTO: 0.4 %
CHOLEST SERPL-MCNC: 142 MG/DL
EOSINOPHIL # BLD AUTO: 0.15 K/UL
EOSINOPHIL NFR BLD AUTO: 2.2 %
ESTIMATED AVERAGE GLUCOSE: 126 MG/DL
HBA1C MFR BLD HPLC: 6 %
HCT VFR BLD CALC: 44.3 %
HDLC SERPL-MCNC: 47 MG/DL
HGB BLD-MCNC: 14.1 G/DL
IMM GRANULOCYTES NFR BLD AUTO: 0.3 %
LDLC SERPL CALC-MCNC: 75 MG/DL
LYMPHOCYTES # BLD AUTO: 2.12 K/UL
LYMPHOCYTES NFR BLD AUTO: 31.2 %
MAN DIFF?: NORMAL
MCHC RBC-ENTMCNC: 31.1 PG
MCHC RBC-ENTMCNC: 31.8 GM/DL
MCV RBC AUTO: 97.8 FL
MONOCYTES # BLD AUTO: 0.49 K/UL
MONOCYTES NFR BLD AUTO: 7.2 %
NEUTROPHILS # BLD AUTO: 3.98 K/UL
NEUTROPHILS NFR BLD AUTO: 58.7 %
NONHDLC SERPL-MCNC: 96 MG/DL
PLATELET # BLD AUTO: 295 K/UL
RBC # BLD: 4.53 M/UL
RBC # FLD: 12.9 %
TRIGL SERPL-MCNC: 114 MG/DL
TSH SERPL-ACNC: 3.34 UIU/ML
WBC # FLD AUTO: 6.79 K/UL

## 2024-07-08 ENCOUNTER — TRANSCRIPTION ENCOUNTER (OUTPATIENT)
Age: 64
End: 2024-07-08

## 2024-07-23 RX ORDER — ALENDRONATE SODIUM 70 MG/1
70 TABLET ORAL
Qty: 12 | Refills: 1 | Status: ACTIVE | COMMUNITY
Start: 2024-07-23 | End: 1900-01-01

## 2024-09-30 NOTE — DATA REVIEWED
[FreeTextEntry1] : EKG shows normal sinus rhythm at 70 BPM.\par \par 
bilateral LE Active ROM was WFL  (within functional limits)

## 2024-11-12 ENCOUNTER — APPOINTMENT (OUTPATIENT)
Dept: ORTHOPEDIC SURGERY | Facility: CLINIC | Age: 64
End: 2024-11-12
Payer: COMMERCIAL

## 2024-11-12 DIAGNOSIS — M17.0 BILATERAL PRIMARY OSTEOARTHRITIS OF KNEE: ICD-10-CM

## 2024-11-12 PROCEDURE — 73562 X-RAY EXAM OF KNEE 3: CPT | Mod: 50

## 2024-11-12 PROCEDURE — 99214 OFFICE O/P EST MOD 30 MIN: CPT

## 2024-11-12 RX ORDER — MELOXICAM 15 MG/1
15 TABLET ORAL
Qty: 30 | Refills: 2 | Status: ACTIVE | COMMUNITY
Start: 2024-11-12 | End: 1900-01-01

## 2024-12-05 ENCOUNTER — RX RENEWAL (OUTPATIENT)
Age: 64
End: 2024-12-05

## 2024-12-12 ENCOUNTER — APPOINTMENT (OUTPATIENT)
Dept: INTERNAL MEDICINE | Facility: CLINIC | Age: 64
End: 2024-12-12
Payer: COMMERCIAL

## 2024-12-12 VITALS
TEMPERATURE: 98.1 F | HEART RATE: 82 BPM | HEIGHT: 69 IN | BODY MASS INDEX: 34.22 KG/M2 | SYSTOLIC BLOOD PRESSURE: 122 MMHG | WEIGHT: 231.06 LBS | DIASTOLIC BLOOD PRESSURE: 80 MMHG | OXYGEN SATURATION: 96 % | RESPIRATION RATE: 16 BRPM

## 2024-12-12 DIAGNOSIS — M81.0 AGE-RELATED OSTEOPOROSIS W/OUT CURRENT PATHOLOGICAL FRACTURE: ICD-10-CM

## 2024-12-12 DIAGNOSIS — E03.8 OTHER SPECIFIED HYPOTHYROIDISM: ICD-10-CM

## 2024-12-12 DIAGNOSIS — E78.5 HYPERLIPIDEMIA, UNSPECIFIED: ICD-10-CM

## 2024-12-12 DIAGNOSIS — E55.9 VITAMIN D DEFICIENCY, UNSPECIFIED: ICD-10-CM

## 2024-12-12 DIAGNOSIS — E78.1 PURE HYPERGLYCERIDEMIA: ICD-10-CM

## 2024-12-12 DIAGNOSIS — E66.811 OBESITY, CLASS 1: ICD-10-CM

## 2024-12-12 DIAGNOSIS — M25.562 PAIN IN LEFT KNEE: ICD-10-CM

## 2024-12-12 DIAGNOSIS — M17.11 UNILATERAL PRIMARY OSTEOARTHRITIS, RIGHT KNEE: ICD-10-CM

## 2024-12-12 DIAGNOSIS — R73.9 HYPERGLYCEMIA, UNSPECIFIED: ICD-10-CM

## 2024-12-12 PROCEDURE — 99214 OFFICE O/P EST MOD 30 MIN: CPT

## 2024-12-12 PROCEDURE — G2211 COMPLEX E/M VISIT ADD ON: CPT

## 2024-12-15 LAB
25(OH)D3 SERPL-MCNC: 33.7 NG/ML
ALBUMIN SERPL ELPH-MCNC: 4.8 G/DL
ALP BLD-CCNC: 93 U/L
ALT SERPL-CCNC: 21 U/L
ANION GAP SERPL CALC-SCNC: 15 MMOL/L
AST SERPL-CCNC: 20 U/L
BILIRUB SERPL-MCNC: 0.9 MG/DL
BUN SERPL-MCNC: 18 MG/DL
CALCIUM SERPL-MCNC: 10 MG/DL
CHLORIDE SERPL-SCNC: 103 MMOL/L
CHOLEST SERPL-MCNC: 160 MG/DL
CO2 SERPL-SCNC: 24 MMOL/L
CREAT SERPL-MCNC: 1.02 MG/DL
EGFR: 62 ML/MIN/1.73M2
ESTIMATED AVERAGE GLUCOSE: 117 MG/DL
GLUCOSE SERPL-MCNC: 103 MG/DL
HBA1C MFR BLD HPLC: 5.7 %
HDLC SERPL-MCNC: 53 MG/DL
LDLC SERPL CALC-MCNC: 84 MG/DL
NONHDLC SERPL-MCNC: 107 MG/DL
POTASSIUM SERPL-SCNC: 4.2 MMOL/L
PROT SERPL-MCNC: 7.4 G/DL
SODIUM SERPL-SCNC: 142 MMOL/L
T3FREE SERPL-MCNC: 3.53 PG/ML
T4 FREE SERPL-MCNC: 1.3 NG/DL
THYROGLOB AB SERPL-ACNC: 16.2 IU/ML
THYROPEROXIDASE AB SERPL IA-ACNC: <9 IU/ML
TRIGL SERPL-MCNC: 134 MG/DL
TSH SERPL-ACNC: 2.94 UIU/ML

## 2024-12-23 ENCOUNTER — APPOINTMENT (OUTPATIENT)
Dept: ORTHOPEDIC SURGERY | Facility: CLINIC | Age: 64
End: 2024-12-23

## 2025-03-05 ENCOUNTER — RX RENEWAL (OUTPATIENT)
Age: 65
End: 2025-03-05

## 2025-04-14 ENCOUNTER — RX RENEWAL (OUTPATIENT)
Age: 65
End: 2025-04-14

## 2025-04-30 ENCOUNTER — APPOINTMENT (OUTPATIENT)
Dept: INTERNAL MEDICINE | Facility: CLINIC | Age: 65
End: 2025-04-30
Payer: COMMERCIAL

## 2025-04-30 VITALS
DIASTOLIC BLOOD PRESSURE: 82 MMHG | WEIGHT: 230 LBS | SYSTOLIC BLOOD PRESSURE: 126 MMHG | OXYGEN SATURATION: 98 % | BODY MASS INDEX: 34.07 KG/M2 | TEMPERATURE: 98 F | HEART RATE: 93 BPM | RESPIRATION RATE: 16 BRPM | HEIGHT: 69 IN

## 2025-04-30 DIAGNOSIS — R21 RASH AND OTHER NONSPECIFIC SKIN ERUPTION: ICD-10-CM

## 2025-04-30 DIAGNOSIS — I73.00 RAYNAUD'S SYNDROME W/OUT GANGRENE: ICD-10-CM

## 2025-04-30 DIAGNOSIS — B02.9 ZOSTER W/OUT COMPLICATIONS: ICD-10-CM

## 2025-04-30 DIAGNOSIS — M25.562 PAIN IN LEFT KNEE: ICD-10-CM

## 2025-04-30 DIAGNOSIS — M17.11 UNILATERAL PRIMARY OSTEOARTHRITIS, RIGHT KNEE: ICD-10-CM

## 2025-04-30 DIAGNOSIS — E78.5 HYPERLIPIDEMIA, UNSPECIFIED: ICD-10-CM

## 2025-04-30 PROCEDURE — G2211 COMPLEX E/M VISIT ADD ON: CPT

## 2025-04-30 PROCEDURE — 99215 OFFICE O/P EST HI 40 MIN: CPT

## 2025-04-30 RX ORDER — VALACYCLOVIR 1 G/1
1 TABLET, FILM COATED ORAL
Qty: 21 | Refills: 0 | Status: ACTIVE | COMMUNITY
Start: 2025-04-30 | End: 1900-01-01

## 2025-05-25 ENCOUNTER — NON-APPOINTMENT (OUTPATIENT)
Age: 65
End: 2025-05-25

## 2025-06-11 ENCOUNTER — RX RENEWAL (OUTPATIENT)
Age: 65
End: 2025-06-11

## 2025-07-07 ENCOUNTER — APPOINTMENT (OUTPATIENT)
Dept: INTERNAL MEDICINE | Facility: CLINIC | Age: 65
End: 2025-07-07
Payer: COMMERCIAL

## 2025-07-07 VITALS — SYSTOLIC BLOOD PRESSURE: 130 MMHG | DIASTOLIC BLOOD PRESSURE: 78 MMHG

## 2025-07-07 VITALS
WEIGHT: 220 LBS | HEIGHT: 69 IN | SYSTOLIC BLOOD PRESSURE: 136 MMHG | HEART RATE: 87 BPM | TEMPERATURE: 98.5 F | BODY MASS INDEX: 32.58 KG/M2 | OXYGEN SATURATION: 97 % | DIASTOLIC BLOOD PRESSURE: 80 MMHG

## 2025-07-07 PROCEDURE — 99214 OFFICE O/P EST MOD 30 MIN: CPT

## 2025-07-07 PROCEDURE — G2211 COMPLEX E/M VISIT ADD ON: CPT

## 2025-07-08 LAB
ALBUMIN SERPL ELPH-MCNC: 4.6 G/DL
ALP BLD-CCNC: 84 U/L
ALT SERPL-CCNC: 25 U/L
ANION GAP SERPL CALC-SCNC: 14 MMOL/L
AST SERPL-CCNC: 23 U/L
BILIRUB SERPL-MCNC: 1.1 MG/DL
BUN SERPL-MCNC: 17 MG/DL
CALCIUM SERPL-MCNC: 10 MG/DL
CHLORIDE SERPL-SCNC: 104 MMOL/L
CHOLEST SERPL-MCNC: 156 MG/DL
CO2 SERPL-SCNC: 24 MMOL/L
CREAT SERPL-MCNC: 0.89 MG/DL
EGFRCR SERPLBLD CKD-EPI 2021: 73 ML/MIN/1.73M2
ESTIMATED AVERAGE GLUCOSE: 117 MG/DL
GLUCOSE SERPL-MCNC: 107 MG/DL
HBA1C MFR BLD HPLC: 5.7 %
HDLC SERPL-MCNC: 45 MG/DL
LDLC SERPL-MCNC: 93 MG/DL
NONHDLC SERPL-MCNC: 111 MG/DL
POTASSIUM SERPL-SCNC: 4.6 MMOL/L
PROT SERPL-MCNC: 7 G/DL
SODIUM SERPL-SCNC: 141 MMOL/L
T3FREE SERPL-MCNC: 3.32 PG/ML
T4 FREE SERPL-MCNC: 1.2 NG/DL
THYROGLOB AB SERPL-ACNC: 17.1 IU/ML
THYROPEROXIDASE AB SERPL IA-ACNC: <9 IU/ML
TRIGL SERPL-MCNC: 101 MG/DL
TSH SERPL-ACNC: 1.81 UIU/ML

## 2025-07-10 ENCOUNTER — APPOINTMENT (OUTPATIENT)
Dept: ORTHOPEDIC SURGERY | Facility: CLINIC | Age: 65
End: 2025-07-10

## 2025-07-10 PROCEDURE — 20610 DRAIN/INJ JOINT/BURSA W/O US: CPT | Mod: RT

## 2025-07-10 PROCEDURE — 73562 X-RAY EXAM OF KNEE 3: CPT | Mod: RT

## 2025-07-10 PROCEDURE — 99213 OFFICE O/P EST LOW 20 MIN: CPT | Mod: 25

## 2025-08-14 ENCOUNTER — APPOINTMENT (OUTPATIENT)
Dept: ORTHOPEDIC SURGERY | Facility: CLINIC | Age: 65
End: 2025-08-14